# Patient Record
Sex: FEMALE | Race: BLACK OR AFRICAN AMERICAN | NOT HISPANIC OR LATINO | Employment: PART TIME | URBAN - METROPOLITAN AREA
[De-identification: names, ages, dates, MRNs, and addresses within clinical notes are randomized per-mention and may not be internally consistent; named-entity substitution may affect disease eponyms.]

---

## 2017-01-14 ENCOUNTER — HOSPITAL ENCOUNTER (EMERGENCY)
Facility: HOSPITAL | Age: 42
Discharge: HOME | End: 2017-01-14
Attending: EMERGENCY MEDICINE
Payer: MEDICAID

## 2017-01-14 VITALS
TEMPERATURE: 99 F | RESPIRATION RATE: 18 BRPM | HEIGHT: 67 IN | HEART RATE: 72 BPM | OXYGEN SATURATION: 98 % | SYSTOLIC BLOOD PRESSURE: 129 MMHG | WEIGHT: 293 LBS | BODY MASS INDEX: 45.99 KG/M2 | DIASTOLIC BLOOD PRESSURE: 72 MMHG

## 2017-01-14 DIAGNOSIS — M79.602 ARM PAIN, ANTERIOR, LEFT: Primary | ICD-10-CM

## 2017-01-14 PROCEDURE — 63600175 PHARM REV CODE 636 W HCPCS: Performed by: EMERGENCY MEDICINE

## 2017-01-14 PROCEDURE — 99283 EMERGENCY DEPT VISIT LOW MDM: CPT | Mod: 25

## 2017-01-14 PROCEDURE — 96372 THER/PROPH/DIAG INJ SC/IM: CPT

## 2017-01-14 RX ORDER — ORPHENADRINE CITRATE 30 MG/ML
60 INJECTION INTRAMUSCULAR; INTRAVENOUS
Status: COMPLETED | OUTPATIENT
Start: 2017-01-14 | End: 2017-01-14

## 2017-01-14 RX ORDER — KETOROLAC TROMETHAMINE 30 MG/ML
15 INJECTION, SOLUTION INTRAMUSCULAR; INTRAVENOUS
Status: COMPLETED | OUTPATIENT
Start: 2017-01-14 | End: 2017-01-14

## 2017-01-14 RX ORDER — DICLOFENAC SODIUM 75 MG/1
75 TABLET, DELAYED RELEASE ORAL 2 TIMES DAILY
Qty: 60 TABLET | Refills: 0 | Status: SHIPPED | OUTPATIENT
Start: 2017-01-14 | End: 2018-09-24

## 2017-01-14 RX ORDER — CYCLOBENZAPRINE HCL 10 MG
10 TABLET ORAL 3 TIMES DAILY PRN
Qty: 15 TABLET | Refills: 0 | Status: SHIPPED | OUTPATIENT
Start: 2017-01-14 | End: 2017-01-19

## 2017-01-14 RX ADMIN — KETOROLAC TROMETHAMINE 15 MG: 30 INJECTION, SOLUTION INTRAMUSCULAR at 03:01

## 2017-01-14 RX ADMIN — ORPHENADRINE CITRATE 60 MG: 30 INJECTION INTRAMUSCULAR; INTRAVENOUS at 03:01

## 2017-01-14 NOTE — ED TRIAGE NOTES
Pt arrives to ED via personal transportation with c/o left arm pain x couple days. Denies chest pain, SOB, n/v/d or any other symptoms at this time.

## 2017-01-14 NOTE — ED PROVIDER NOTES
Encounter Date: 1/14/2017       History     Chief Complaint   Patient presents with    arm pain     L arm pain and burning X 3 days. Denies injury.      Review of patient's allergies indicates:  No Known Allergies  HPI   Atraumatic L arm pain x 3 days, describes pain as L lateral humeral area down to L forearm  No neck pain, new activities, inability to ambulate, n/t/w hand  Never had this before  No self treatment    No past medical history on file.  No past medical history pertinent negatives.  No past surgical history on file.  No family history on file.  Social History   Substance Use Topics    Smoking status: Not on file    Smokeless tobacco: Not on file    Alcohol use Not on file     Review of Systems  All systems were reviewed and were negative except as noted in the HPI.    Physical Exam   Initial Vitals   BP Pulse Resp Temp SpO2   01/14/17 1512 01/14/17 1512 01/14/17 1512 01/14/17 1512 01/14/17 1512   129/72 72 18 99 °F (37.2 °C) 98 %     Physical Exam    Gen: awake, alert, NAD  Head NCAT, sclera clear  Neck supple, no meningismus  nt in midline  No carotid bruits  L arm, compartments are soft and externally normal without signs of trauma nor infection  Hands nvi  Extremities W/WP   Ambulation ok  Skin w/d  Psych conversant  Neuro: A/A, MONTALVO      ED Course   Procedures  Labs Reviewed   POCT URINE PREGNANCY        hcg neg  IM analgesics  Evaluation for Emergency Medical Condition  The patient received a medical screening exam and within a reasonable degree of clinical confidence an emergency medical condition has not been identified.  The patient is instructed on proper follow up and return precautions to the ED.                         ED Course     Clinical Impression:   The encounter diagnosis was Arm pain, anterior, left.      Discharged to home in stable condition, return to ED warnings given, follow up and patient care instructions given.    Jim Gil MD, MOISÉS, CPE, FACEP  Department of Emergency  Medicine  , [facility]       David Gil MD  01/14/17 9674

## 2017-01-14 NOTE — ED AVS SNAPSHOT
[facility]  [address] [place] [address]               Hadley Cano   2017  3:14 PM   ED    Description:  Female : 1975   Department:  [facility]           Your Care was Coordinated By:     Provider Role From To    David Gil MD Attending Provider 17 1514 --      Reason for Visit     arm pain           Diagnoses this Visit        Comments    Arm pain, anterior, left    -  Primary       ED Disposition     None           To Do List           Follow-up Information     Schedule an appointment as soon as possible for a visit with [facility].    Specialty:  Internal Medicine    Contact information:    [address] [place] [address] [phone]    Additional information:    3rd Floor, [address]       These Medications        Disp Refills Start End    diclofenac (VOLTAREN) 75 MG EC tablet 60 tablet 0 2017     Take 1 tablet (75 mg total) by mouth 2 (two) times daily. - Oral    cyclobenzaprine (FLEXERIL) 10 MG tablet 15 tablet 0 2017    Take 1 tablet (10 mg total) by mouth 3 (three) times daily as needed for Muscle spasms. - Oral      [facility] On [facility]     [facility] -  Assistance  Registered nurses in the [facility] provide clinical advisement, health education, appointment booking, and other advisory services.  Call for this free service at [phone].             Medications           Message regarding Medications     Verify the changes and/or additions to your medication regime listed below are the same as discussed with your clinician today.  If any of these changes or additions are incorrect, please notify your healthcare provider.        START taking these NEW medications        Refills    diclofenac (VOLTAREN) 75 MG EC tablet 0    Sig: Take 1 tablet (75 mg total) by mouth 2 (two) times daily.    Class: Print    Route:  "Oral    cyclobenzaprine (FLEXERIL) 10 MG tablet 0    Sig: Take 1 tablet (10 mg total) by mouth 3 (three) times daily as needed for Muscle spasms.    Class: Print    Route: Oral      These medications were administered today        Dose Freq    ketorolac injection 15 mg 15 mg ED 1 Time    Sig: Inject 15 mg into the muscle ED 1 Time.    Class: Normal    Route: Intramuscular    orphenadrine injection 60 mg 60 mg ED 1 Time    Sig: Inject 2 mLs (60 mg total) into the muscle ED 1 Time.    Class: Normal    Route: Intramuscular           Verify that the below list of medications is an accurate representation of the medications you are currently taking.  If none reported, the list may be blank. If incorrect, please contact your healthcare provider. Carry this list with you in case of emergency.           Current Medications     cyclobenzaprine (FLEXERIL) 10 MG tablet Take 1 tablet (10 mg total) by mouth 3 (three) times daily as needed for Muscle spasms.    diclofenac (VOLTAREN) 75 MG EC tablet Take 1 tablet (75 mg total) by mouth 2 (two) times daily.    ketorolac injection 15 mg Inject 15 mg into the muscle ED 1 Time.    orphenadrine injection 60 mg Inject 2 mLs (60 mg total) into the muscle ED 1 Time.           Clinical Reference Information           Your Vitals Were     BP Pulse Temp Resp Height Weight    129/72 (BP Location: Right arm) 72 99 °F (37.2 °C) (Oral) 18 5' 7" (1.702 m) 184.6 kg (407 lb)    SpO2 BMI             98% 63.75 kg/m2         Allergies as of 1/14/2017     No Known Allergies      Immunizations Administered on Date of Encounter - 1/14/2017     None      ED Micro, Lab, POCT     Start Ordered       Status Ordering Provider    01/14/17 1518 01/14/17 1517  POCT urine pregnancy  Once      Acknowledged       ED Imaging Orders     None      Discharge References/Attachments     PAIN, ACUTE, UNCERTAIN CAUSE (ENGLISH)      [facility] Sign-Up     Activating your [facility] account is as easy as 1-2-3!     1) Visit " [email or web address], select Sign Up Now, enter this activation code and your date of birth, then select Next.  [email or web address]  Expires: 2/28/2017  3:20 PM      2) Create a username and password to use when you visit [facility] in the future and select a security question in case you lose your password and select Next.    3) Enter your e-mail address and click Sign Up!    Additional Information  If you have questions, please e-mail [email or web address] or call [phone] to talk to our [facility] staff. Remember, [facility] is NOT to be used for urgent needs. For medical emergencies, dial 911.         Smoking Cessation     If you would like to quit smoking:   You may be eligible for free services if you are a [place] resident and started smoking cigarettes before September 1, 1988.  Call the Smoking Cessation Trust (SCT) toll free at (993) 990-5535 or (987) 791-6416.   Call 5-239-QUIT-NOW if you do not meet the above criteria.             [facility] complies with applicable Federal civil rights laws and does not discriminate on the basis of race, color, national origin, age, disability, or sex.        Language Assistance Services     ATTENTION: Language assistance services are available, free of charge. Please call [phone].      ATENCIÓN: Si habla español, tiene a grove disposición servicios gratuitos de asistencia lingüística. Llame al [phone].     CHÚ Ý: N?u b?n nói Ti?ng Vi?t, có các d?ch v? h? tr? ngôn ng? mi?n phí dành cho b?n. G?i s? [phone].

## 2018-01-03 ENCOUNTER — HOSPITAL ENCOUNTER (EMERGENCY)
Facility: HOSPITAL | Age: 43
Discharge: HOME | End: 2018-01-03
Attending: EMERGENCY MEDICINE
Payer: MEDICAID

## 2018-01-03 VITALS
SYSTOLIC BLOOD PRESSURE: 152 MMHG | HEIGHT: 67 IN | BODY MASS INDEX: 45.99 KG/M2 | HEART RATE: 66 BPM | OXYGEN SATURATION: 99 % | RESPIRATION RATE: 16 BRPM | WEIGHT: 293 LBS | TEMPERATURE: 98 F | DIASTOLIC BLOOD PRESSURE: 76 MMHG

## 2018-01-03 DIAGNOSIS — R10.13 EPIGASTRIC PAIN: ICD-10-CM

## 2018-01-03 DIAGNOSIS — R10.9 ABDOMINAL PAIN: ICD-10-CM

## 2018-01-03 DIAGNOSIS — K80.20 CALCULUS OF GALLBLADDER WITHOUT CHOLECYSTITIS WITHOUT OBSTRUCTION: Primary | ICD-10-CM

## 2018-01-03 LAB
ALBUMIN SERPL BCP-MCNC: 3.1 G/DL
ALP SERPL-CCNC: 89 U/L
ALT SERPL W/O P-5'-P-CCNC: 17 U/L
ANION GAP SERPL CALC-SCNC: 9 MMOL/L
AST SERPL-CCNC: 49 U/L
BACTERIA #/AREA URNS HPF: NORMAL /HPF
BASOPHILS # BLD AUTO: 0.01 K/UL
BASOPHILS NFR BLD: 0.1 %
BILIRUB SERPL-MCNC: 0.3 MG/DL
BILIRUB UR QL STRIP: NEGATIVE
BUN SERPL-MCNC: 12 MG/DL
CALCIUM SERPL-MCNC: 9.3 MG/DL
CHLORIDE SERPL-SCNC: 108 MMOL/L
CLARITY UR: ABNORMAL
CO2 SERPL-SCNC: 22 MMOL/L
COLOR UR: YELLOW
CREAT SERPL-MCNC: 0.8 MG/DL
DIFFERENTIAL METHOD: NORMAL
EOSINOPHIL # BLD AUTO: 0.1 K/UL
EOSINOPHIL NFR BLD: 0.7 %
ERYTHROCYTE [DISTWIDTH] IN BLOOD BY AUTOMATED COUNT: 13.8 %
EST. GFR  (AFRICAN AMERICAN): >60 ML/MIN/1.73 M^2
EST. GFR  (NON AFRICAN AMERICAN): >60 ML/MIN/1.73 M^2
GLUCOSE SERPL-MCNC: 116 MG/DL
GLUCOSE UR QL STRIP: NEGATIVE
HCT VFR BLD AUTO: 40.6 %
HGB BLD-MCNC: 13.2 G/DL
HGB UR QL STRIP: ABNORMAL
KETONES UR QL STRIP: NEGATIVE
LEUKOCYTE ESTERASE UR QL STRIP: ABNORMAL
LIPASE SERPL-CCNC: 18 U/L
LYMPHOCYTES # BLD AUTO: 3 K/UL
LYMPHOCYTES NFR BLD: 26.8 %
MCH RBC QN AUTO: 28.1 PG
MCHC RBC AUTO-ENTMCNC: 32.5 G/DL
MCV RBC AUTO: 86 FL
MICROSCOPIC COMMENT: NORMAL
MONOCYTES # BLD AUTO: 0.5 K/UL
MONOCYTES NFR BLD: 4.2 %
NEUTROPHILS # BLD AUTO: 7.6 K/UL
NEUTROPHILS NFR BLD: 68.2 %
NITRITE UR QL STRIP: NEGATIVE
PH UR STRIP: 5 [PH] (ref 5–8)
PLATELET # BLD AUTO: 329 K/UL
PMV BLD AUTO: 9.5 FL
POTASSIUM SERPL-SCNC: 4.2 MMOL/L
PROT SERPL-MCNC: 7.5 G/DL
PROT UR QL STRIP: NEGATIVE
RBC # BLD AUTO: 4.7 M/UL
RBC #/AREA URNS HPF: 2 /HPF (ref 0–4)
SODIUM SERPL-SCNC: 139 MMOL/L
SP GR UR STRIP: 1.02 (ref 1–1.03)
SQUAMOUS #/AREA URNS HPF: 3 /HPF
URN SPEC COLLECT METH UR: ABNORMAL
UROBILINOGEN UR STRIP-ACNC: ABNORMAL EU/DL
WBC # BLD AUTO: 11.15 K/UL
WBC #/AREA URNS HPF: 1 /HPF (ref 0–5)

## 2018-01-03 PROCEDURE — 80053 COMPREHEN METABOLIC PANEL: CPT

## 2018-01-03 PROCEDURE — 93005 ELECTROCARDIOGRAM TRACING: CPT

## 2018-01-03 PROCEDURE — 81000 URINALYSIS NONAUTO W/SCOPE: CPT

## 2018-01-03 PROCEDURE — 85025 COMPLETE CBC W/AUTO DIFF WBC: CPT

## 2018-01-03 PROCEDURE — 93010 ELECTROCARDIOGRAM REPORT: CPT | Mod: ,,, | Performed by: INTERNAL MEDICINE

## 2018-01-03 PROCEDURE — 99284 EMERGENCY DEPT VISIT MOD MDM: CPT | Mod: 25

## 2018-01-03 PROCEDURE — 83690 ASSAY OF LIPASE: CPT

## 2018-01-03 PROCEDURE — 25000003 PHARM REV CODE 250: Performed by: NURSE PRACTITIONER

## 2018-01-03 RX ORDER — ONDANSETRON 4 MG/1
4 TABLET, ORALLY DISINTEGRATING ORAL EVERY 8 HOURS PRN
Qty: 15 TABLET | Refills: 0 | Status: SHIPPED | OUTPATIENT
Start: 2018-01-03 | End: 2018-09-24

## 2018-01-03 RX ORDER — HYDROCODONE BITARTRATE AND ACETAMINOPHEN 5; 325 MG/1; MG/1
2 TABLET ORAL
Status: COMPLETED | OUTPATIENT
Start: 2018-01-03 | End: 2018-01-03

## 2018-01-03 RX ORDER — FAMOTIDINE 20 MG/1
20 TABLET, FILM COATED ORAL ONCE
Status: COMPLETED | OUTPATIENT
Start: 2018-01-03 | End: 2018-01-03

## 2018-01-03 RX ORDER — ONDANSETRON 4 MG/1
4 TABLET, ORALLY DISINTEGRATING ORAL
Status: COMPLETED | OUTPATIENT
Start: 2018-01-03 | End: 2018-01-03

## 2018-01-03 RX ADMIN — ONDANSETRON 4 MG: 4 TABLET, ORALLY DISINTEGRATING ORAL at 02:01

## 2018-01-03 RX ADMIN — LIDOCAINE HYDROCHLORIDE: 20 SOLUTION ORAL; TOPICAL at 02:01

## 2018-01-03 RX ADMIN — HYDROCODONE BITARTRATE AND ACETAMINOPHEN 2 TABLET: 5; 325 TABLET ORAL at 05:01

## 2018-01-03 RX ADMIN — FAMOTIDINE 20 MG: 20 TABLET, FILM COATED ORAL at 02:01

## 2018-01-03 NOTE — ED PROVIDER NOTES
Encounter Date: 1/3/2018    SCRIBE #1 NOTE: I, Daisy Quezada , am scribing for, and in the presence of,  Odalys Contreras NP . I have scribed the following portions of the note - Other sections scribed: HPI/ROS .       History     Chief Complaint   Patient presents with    Abdominal Pain     epigastric pain.. started earlier today when sister in same day surgery.. feels intermittent pains.. no hx of abdmoninal problems     CC: Abdominal Pain     HPI: This 42 y.o. female presents to the ED c/o sudden-onset abdominal pain described as a sharp, stabbing, and cramping pain across her upper abdomen and RUQ that was initially accompanied by SOB, diaphoresis, lightheadedness, nausea, and feeling hot which began while seated upstairs visiting her sister in same day surgery just 30 minutes PTA. She notes that the SOB, diaphoresis, lightheadedness, nausea, and sensation of feeling hot is her normal response to pain and adds that all of her symptoms have resolved except for her abdominal pain which is currently worst to the epigastric area. She ate a peppermint, but denies any other attempted pain tx. Pt last ate eggs and toast for breakfast several hours prior to the onset of pain. No BM today. She reports a hx of similar abdominal pain 1 year ago when she was evaluated at [facility] for possible gallstones, which she did not have at the time. She denies vomiting, diarrhea, numbness, weakness, chest pain, and any other associated symptoms.       The history is provided by the patient. No  was used.     Review of patient's allergies indicates:  No Known Allergies  No past medical history on file.  History reviewed. No pertinent surgical history.  History reviewed. No pertinent family history.  Social History   Substance Use Topics    Smoking status: Former Smoker     Types: Cigarettes    Smokeless tobacco: Not on file    Alcohol use No     Review of Systems   Constitutional: Negative for chills and fever.   HENT:  Negative for congestion, ear pain, rhinorrhea and sore throat.    Eyes: Negative for visual disturbance.   Respiratory: Negative for cough and shortness of breath.    Cardiovascular: Negative for chest pain.   Gastrointestinal: Positive for abdominal pain (across the upper abdomen and RUQ). Negative for constipation, diarrhea, nausea and vomiting.   Genitourinary: Negative for dysuria.   Musculoskeletal: Negative for back pain and neck pain.   Skin: Negative for rash.   Neurological: Negative for dizziness, weakness, light-headedness, numbness and headaches.       Physical Exam     Initial Vitals [01/03/18 1343]   BP Pulse Resp Temp SpO2   134/84 68 16 98.2 °F (36.8 °C) 99 %      MAP       100.67         Physical Exam    Nursing note and vitals reviewed.  Constitutional: Vital signs are normal. She appears well-developed and well-nourished. She is not diaphoretic. She is Obese . She is active and cooperative.  Non-toxic appearance. She does not appear ill. No distress.   Abdominal: Soft. Normal appearance and bowel sounds are normal. She exhibits no distension and no mass. There is tenderness in the right upper quadrant and epigastric area. There is no rigidity, no rebound, no guarding, no CVA tenderness, no tenderness at McBurney's point and negative Segovia's sign. No hernia.   Neurological: She is alert and oriented to person, place, and time.   Skin: Skin is dry. Capillary refill takes less than 2 seconds.         ED Course   Procedures  Labs Reviewed   COMPREHENSIVE METABOLIC PANEL - Abnormal; Notable for the following:        Result Value    CO2 22 (*)     Glucose 116 (*)     Albumin 3.1 (*)     AST 49 (*)     All other components within normal limits   URINALYSIS - Abnormal; Notable for the following:     Appearance, UA Hazy (*)     Occult Blood UA 2+ (*)     Urobilinogen, UA 2.0-3.0 (*)     Leukocytes, UA Trace (*)     All other components within normal limits   CBC W/ AUTO DIFFERENTIAL   LIPASE   URINALYSIS  MICROSCOPIC   POCT URINE PREGNANCY                Additional MDM:   Comments: This is an urgent evaluation of a 42-year-old female that presents to the emergency room complaining of abdominal pain with nausea today.  Patient reports she has had intermittent symptoms for the past month, but they are severe today.  She also reports that she was seen at [facility] emergency room approximately 1 year ago for similar complaint, but ultrasound was negative for gallstones at that time.  On exam, she appears uncomfortable but is not ill or toxic appearing.  She is afebrile with normal vital signs.  She has mild to moderate tenderness to palpation over the epigastrium and right upper quadrant.  Segovia sign negative.  Differential diagnoses included: Cholelithiasis, acute cholecystitis, gastritis versus peptic ulcer disease, hepatitis, pancreatitis, ileus.  I carefully considered but doubt appendicitis, ovarian torsion, diverticulitis, perforated bowel, incarcerated bowel.  Labs were grossly unremarkable, with no leukocytosis, transaminitis.  Her lipase and T bili were within normal limits.  The patient did receive a GI cocktail and Pepcid in the ED, with mild improvement of her pain.  She was then referred for a right upper quadrant ultrasound which revealed multiple stones within the gallbladder with questionable call bladder wall thickening measuring 3-4 mm.  There is no biliary duct dilatation or sonographic Segovia sign to suggest acute cholecystitis.  Based on her exam today, I highly doubt acute cholecystitis.  The patient still reports that she has right upper quadrant pain, but she continues to be afebrile with normal vital signs.  Symptoms are consistent with biliary colic.  Recommended follow-up with Gen. surgery as outpatient for further evaluation and management of her gallstones.  Rx Zofran and recommended low-fat diet.  She was given strict return precautions for any new or worsening symptoms, specifically  worsening abdominal pain, fevers, intractable vomiting.  She verbalized understanding and adherence..          Scribe Attestation:   Scribe #1: I performed the above scribed service and the documentation accurately describes the services I performed. I attest to the accuracy of the note.    Attending Attestation:           Physician Attestation for Scribe:  Physician Attestation Statement for Scribe #1: I, Odalys Contreras NP , reviewed documentation, as scribed by Daisy Quezada  in my presence, and it is both accurate and complete.                 ED Course      Clinical Impression:   The primary encounter diagnosis was Calculus of gallbladder without cholecystitis without obstruction. Diagnoses of Abdominal pain and Epigastric pain were also pertinent to this visit.    Disposition:   Disposition: Discharged  Condition: Stable                        Odalys Contreras NP  01/03/18 7708

## 2018-01-03 NOTE — ED TRIAGE NOTES
Pt reports constant pain in her middle upper abdomen that is sharp and shoots to her right upper abdomen x20 minutes. Pt also reports nausea.

## 2018-09-24 ENCOUNTER — OFFICE VISIT (OUTPATIENT)
Dept: ORTHOPEDICS | Facility: CLINIC | Age: 43
End: 2018-09-24
Payer: MEDICAID

## 2018-09-24 VITALS
SYSTOLIC BLOOD PRESSURE: 142 MMHG | DIASTOLIC BLOOD PRESSURE: 86 MMHG | HEIGHT: 67 IN | WEIGHT: 293 LBS | HEART RATE: 84 BPM | BODY MASS INDEX: 45.99 KG/M2

## 2018-09-24 DIAGNOSIS — M25.561 PAIN IN BOTH KNEES, UNSPECIFIED CHRONICITY: Primary | ICD-10-CM

## 2018-09-24 DIAGNOSIS — D16.21 OSTEOCHONDROMA OF TIBIA, RIGHT: ICD-10-CM

## 2018-09-24 DIAGNOSIS — M25.562 PAIN IN BOTH KNEES, UNSPECIFIED CHRONICITY: Primary | ICD-10-CM

## 2018-09-24 DIAGNOSIS — M17.0 OSTEOARTHRITIS OF BOTH KNEES, UNSPECIFIED OSTEOARTHRITIS TYPE: ICD-10-CM

## 2018-09-24 PROCEDURE — 99204 OFFICE O/P NEW MOD 45 MIN: CPT | Mod: S$PBB,,, | Performed by: ORTHOPAEDIC SURGERY

## 2018-09-24 PROCEDURE — 99213 OFFICE O/P EST LOW 20 MIN: CPT | Mod: PBBFAC,PN | Performed by: ORTHOPAEDIC SURGERY

## 2018-09-24 PROCEDURE — 99999 PR PBB SHADOW E&M-EST. PATIENT-LVL III: CPT | Mod: PBBFAC,,, | Performed by: ORTHOPAEDIC SURGERY

## 2018-09-24 RX ORDER — MELOXICAM 15 MG/1
TABLET ORAL
COMMUNITY
Start: 2018-08-31 | End: 2018-09-24

## 2018-09-24 RX ORDER — NAPROXEN 500 MG/1
500 TABLET ORAL 2 TIMES DAILY
Qty: 60 TABLET | Refills: 0 | Status: SHIPPED | OUTPATIENT
Start: 2018-09-24 | End: 2018-10-02

## 2018-09-24 NOTE — PROGRESS NOTES
"CC: bilateral knee pain    43 y.o. Female presents today for evaluation of her bilateral knee pain. She admits to pain starting a few months ago and bothering her consistently since. She has tried a "pain ointment" which has helped but only minimally. She has a 2 year old child who she admits to playing with and kneeling and moving around frequently makes her pain worse. She admits to radiation of her pain down her right leg with occasional tingling to her foot. Her left knee especially medially is her most painful area. She has taken Mobic for this but she states that this causes her to get a headache even if she cuts it in half or if she takes it at night. She denies any instability, denies recent or remote injuries or trauma, and denies any locking. She does feel this is affecting her ADLs and recently had a friend who underwent a knee replacement so she wanted to get this checked out. She has not gone to PT for this issue and has not had a joint injection done yet.     REVIEW OF SYSTEMS:   Constitution: Patient denies fever, chills, night sweats, and weight changes.  Eyes: Patient denies eye pain or vision changes.  HENT: Patient denies headache, ear pain, sore throat, or nasal discharge.  CVS: Patient denies chest pain.  Lungs: Patient denies shortness of breath or cough.  Abd: Patient denies stomach pain, nausea, or vomiting.  Skin: Patient denies skin rash or itching.    Hematologic/Lymphatic: Patient denies easy bruising.   Musculoskeletal: Patient denies recent falls. See HPI.  Psych: Patient denies any current anxiety or nervousness.    PAST MEDICAL HISTORY:   Past Medical History:   Diagnosis Date    Arthritis        PAST SURGICAL HISTORY:   History reviewed. No pertinent surgical history.    FAMILY HISTORY:   History reviewed. No pertinent family history.    SOCIAL HISTORY:   Social History     Socioeconomic History    Marital status: Single     Spouse name: Not on file    Number of children: Not on " "file    Years of education: Not on file    Highest education level: Not on file   Social Needs    Financial resource strain: Not on file    Food insecurity - worry: Not on file    Food insecurity - inability: Not on file    Transportation needs - medical: Not on file    Transportation needs - non-medical: Not on file   Occupational History    Not on file   Tobacco Use    Smoking status: Former Smoker     Types: Cigarettes   Substance and Sexual Activity    Alcohol use: No    Drug use: No    Sexual activity: Not on file   Other Topics Concern    Not on file   Social History Narrative    Not on file       MEDICATIONS:     Current Outpatient Medications:     naproxen (EC NAPROSYN) 500 MG EC tablet, Take 1 tablet (500 mg total) by mouth 2 (two) times daily., Disp: 60 tablet, Rfl: 0    ALLERGIES:   Review of patient's allergies indicates:  No Known Allergies     PHYSICAL EXAMINATION:  BP (!) 142/86 (BP Location: Right arm, Patient Position: Sitting, BP Method: Large (Manual))   Pulse 84   Ht 5' 7" (1.702 m)   Wt (!) 199.8 kg (440 lb 6.4 oz)   LMP 09/03/2018   BMI 68.98 kg/m²   Vitals signs and nursing note have been reviewed.  General: In no acute distress, well developed, well nourished, no diaphoresis  Eyes: EOM full and smooth, no eye redness or discharge  HENT: normocephalic and atraumatic, neck supple, trachea midline, no nasal discharge, no external ear redness or discharge  Cardiovascular: 2+ and symmetric DP pulses bilaterally  Lungs: respirations non-labored, no conversational dyspnea   Abd: non-distended, no rigidity  MSK: no amputation or deformity, no swelling of extremities  Neuro: AAOx3, CN2-12 grossly intact  Skin: No rashes, warm and dry  Psychiatric: cooperative, pleasant, mood and affect appropriate for age    MUSCULOSKELETAL EXAM:    BILATERAL KNEE EXAMINATION     Observation:  No edema, erythema, ecchymosis, or effusion noted.  No muscle atrophy of the thighs and calves " noted.  Knee anatomy difficult to identify due to body habitus.      Tenderness:  Patella - present around each patella Lateral joint line - present on both side  Quad tendon - none   Medial joint line - present on left more than right  Patellar tendon - none   Medial plica - none  Tibial tubercle - none   Lateral plica - none  Pes anserine - none   MCL prox - none  Distal ITB - none   MCL distal - none  MFC - none    LCL prox - none  LFC - none    LCL distal - none  Tibia - none    Fibula - none    No obvious bursae, plicae, popliteal cysts, or tendon derangement palpated.          ROM:   Active extension to 0° on left without hyperextension with crepitus  Active extension to 0° on right without hyperextension with crepitus  Active flexion to 135° on left and 135° on right.    Strength: (bilaterally)  Knee Flexion - 5/5 on left and 5/5 on right  Knee Extension - 5/5 on left and 5/5 on right    Patellofemoral Exam:  No patellar laxity with medial and lateral translation   No apprehension with medial and lateral patellar translation.     Meniscus Testing:     No pain with terminal extension and flexion.    Ligament Testing:  No laxity with anterior drawer.  No laxity with posterior drawer.    No laxity with varus testing at 0 and 30 degrees.  No laxity with valgus testing at 0 and 30 degrees.    Neurovascular Examination:   Normal gait without antalgia.  Sensation intact to light touch in the obturator, lateral/intermediate/medial/posterior femoral cutaneous, saphenous, and common peroneal nerves bilaterally.  Pulses intact at the DP and PT arteries bilaterally.    Capillary refill intact <2 seconds in all toes bilaterally.      IMAGIN. X-ray ordered due to bilateral knee pain  2. X-ray images were reviewed personally by me and then directly with patient.  3. FINDINGS: X-ray images obtained demonstrate no cortical irregularities, sclerosis, osteophyte formation, or subchonral cysts. There is mild joint space  narrowing in all 3 compartments of each knee. Bone growth on right tibia consistent with osteochondroma.  4. IMPRESSION: Joint space narrowing of all compartments of each knee, worst in the left medial. Osteochondroma present on right tibia.      ASSESSMENT:      ICD-10-CM ICD-9-CM   1. Pain in both knees, unspecified chronicity M25.561 719.46    M25.562    2. Osteoarthritis of both knees, unspecified osteoarthritis type M17.0 715.96   3. Class 3 obesity with body mass index (BMI) of 60.0 to 69.9 in adult, unspecified obesity type, unspecified whether serious comorbidity present E66.9 278.00    Z68.44 V85.44       PLAN:  1-2. Knee pain/OA  - XRs obtained in the office today and images were personally reviewed with the patient.   - Overall OA seems mild and could benefit well from conservative options as she has not tried any yet.  - Rx for PT sent today for her to start a PT regimen to get an HEP.  - Rx for Naprosyn 500mg tabs BID.    3. Obesity -   - Discussed importance of weight loss to help with the worsening of her knee OA as even modest weight loss can make an impact on knee pain. Advised starting with walking around her block daily as she has not been doing this recently. In addition, exercises from PT could be done     4. Osteochondroma -   - Seen on XR of right tibia. Benign finding.    Future planning includes - if no improvement with PT and PO meds, possibly US guided steroid injection    All questions were answered to the best of my ability and all concerns were addressed at this time.    Follow up in 4-6 weeks for above, or sooner if needed.

## 2018-09-24 NOTE — LETTER
September 25, 2018      Uma Colvin, NP  [address]  [facility] [address]           [facility]  [address]  [place] [address]  Phone: [phone]  Fax: [phone]          Patient: Hadley Cano   MR Number: [id number]   YOB: 1975   Date of Visit: 9/24/2018       Dear Uma Colvin:    Thank you for referring Hadley Cano to me for evaluation. Attached you will find relevant portions of my assessment and plan of care.    If you have questions, please do not hesitate to call me. I look forward to following Hadley Cano along with you.    Sincerely,    Nicolás Brooks, DO    Enclosure  CC:  No Recipients    If you would like to receive this communication electronically, please contact [email or web address] or [phone] to request more information on [facility] access.    For providers and/or their staff who would like to refer a patient to [facility], please contact us through our one-stop-shop provider referral line, [facility] , at [phone].    If you feel you have received this communication in error or would no longer like to receive these types of communications, please e-mail [email or web address]

## 2018-10-02 DIAGNOSIS — M25.562 PAIN IN BOTH KNEES, UNSPECIFIED CHRONICITY: ICD-10-CM

## 2018-10-02 DIAGNOSIS — M25.561 PAIN IN BOTH KNEES, UNSPECIFIED CHRONICITY: ICD-10-CM

## 2018-10-04 DIAGNOSIS — M25.562 PAIN IN BOTH KNEES, UNSPECIFIED CHRONICITY: Primary | ICD-10-CM

## 2018-10-04 DIAGNOSIS — M25.561 PAIN IN BOTH KNEES, UNSPECIFIED CHRONICITY: Primary | ICD-10-CM

## 2018-10-04 RX ORDER — MELOXICAM 15 MG/1
15 TABLET ORAL DAILY
Qty: 30 TABLET | Refills: 1 | Status: SHIPPED | OUTPATIENT
Start: 2018-10-04 | End: 2018-10-04

## 2018-10-04 RX ORDER — NAPROXEN SODIUM 550 MG/1
550 TABLET ORAL 2 TIMES DAILY WITH MEALS
Qty: 60 TABLET | Refills: 1
Start: 2018-10-04 | End: 2019-07-17

## 2018-10-16 ENCOUNTER — CLINICAL SUPPORT (OUTPATIENT)
Dept: REHABILITATION | Facility: HOSPITAL | Age: 43
End: 2018-10-16
Attending: ORTHOPAEDIC SURGERY
Payer: MEDICAID

## 2018-10-16 DIAGNOSIS — M25.562 CHRONIC PAIN OF BOTH KNEES: ICD-10-CM

## 2018-10-16 DIAGNOSIS — M25.561 CHRONIC PAIN OF BOTH KNEES: ICD-10-CM

## 2018-10-16 DIAGNOSIS — M25.662 DECREASED RANGE OF MOTION (ROM) OF BOTH KNEES: ICD-10-CM

## 2018-10-16 DIAGNOSIS — M25.661 DECREASED RANGE OF MOTION (ROM) OF BOTH KNEES: ICD-10-CM

## 2018-10-16 DIAGNOSIS — G89.29 CHRONIC PAIN OF BOTH KNEES: ICD-10-CM

## 2018-10-16 DIAGNOSIS — R26.2 DIFFICULTY WALKING: ICD-10-CM

## 2018-10-16 DIAGNOSIS — R29.898 DECREASED STRENGTH OF LOWER EXTREMITY: ICD-10-CM

## 2018-10-16 PROCEDURE — 97161 PT EVAL LOW COMPLEX 20 MIN: CPT | Mod: PN

## 2018-10-16 NOTE — PROGRESS NOTES
Please See Full Physical Therapy Evaluation in Plan of Care                                                        Physical Therapy Initial Evaluation     Name: Hadley Cano  Clinic Number: [id number]    Hadley is a 43 y.o. female evaluated on 10/16/2018.     Diagnosis:   Encounter Diagnoses   Name Primary?    Chronic pain of both knees     Decreased range of motion (ROM) of both knees     Decreased strength of lower extremity     Difficulty walking      Physician: Nicolás Brooks, DO  Treatment Orders: PT Eval and Treat    TREATMENT     Time In: 900  Time Out: 1000    PT Evaluation Completed? Yes  Discussed Plan of Care with patient: Yes    Hadley received 15 minutes of therapeutic exercise including:     Quad Set 10x ea  SAQ 10x ea     Hadley received 0 minutes of manual therapy including:    Written Home Exercises Provided: Yes - QS, SAQ  Hadley demonstrated good understanding of the education provided. Patient demonstrated good return demonstration of skill of exercises.    ASSESSMENT     Pt's spiritual, cultural and educational needs considered and pt agreeable to plan of care and goals as stated below:     Short Term GOALS: 4 weeks. Pt agrees with goals set.  1. Patient demonstrates independence with HEP.   2. Patient demonstrates independence with Postural Awareness.   3. Patient demonstrates independence with body mechanics.   4. Patient will report pain of 5/10 at worst, on 0-10 pain scale, with all activity  5 Patient demonstrates increased B knee flexion ROM by 5 degrees or greater to improve tolerance to functional activities pain free.   6. Patient demonstrates increased strength BLE's by 1/3 muscle grade or greater to improve tolerance to functional activities pain free.     Long Term GOALS: 8 weeks. Pt agrees with goals set.  1. Patient demonstrates increased B knee ROM to 0-115 degrees or greater to improve tolerance to functional activities pain free.   2. Patient demonstrates increased strength BLE's  to 4+/5 or greater to improve tolerance to functional activities pain free.   3. Patient demonstrates improved overall function per FOTO Knee Survey to 40% Limitation or less.   4. Patient will report pain of 3/10 at worst, on 0-10 pain scale, with all activity  5. Patient demonstrates ability to walk 2 blocks, appropriate gait pattern, no pain provocation  6. Patient demonstrates ability to perform functional squat, appropriate movement pattern, no pain provocation to increase tolerance to work activity    Functional Limitations Reports - G Codes  Category: mobility  Tool: FOTO Knee Survey  Score: 49% Limitation   Modifier  Impairment Limitation Restriction    CH  0 % impaired, limited or restricted    CI  @ least 1% but less than 20% impaired, limited or restricted    CJ  @ least 20%<40% impaired, limited or restricted    CK  @ least 40%<60% impaired, limited or restricted    CL  @ least 60% <80% impaired, limited or restricted    CM  @ least 80%<100% impaired limited or restricted    CN  100% impaired, limited or restricted     Current/  CK = 40-60% Limitation  Goal/ : CJ = 20-40% Limitation     PLAN     Certification Period: 10/16/18 - 1/16/19    Outpatient physical therapy 1-2 times weekly to include: pt ed, hep, therapeutic exercises, neuromuscular re-education/ balance exercises, manual therapy, joint mobilizations, aquatic therapy and modalities prn. Cont PT for  10-12 weeks. Pt may be seen by PTA as part of the rehabilitation team.     Therapist: Oscar Trevino, PT  10/16/2018      I have seen the patient, reviewed the therapist's plan of care, and I agree with the plan of care.      I certify the need for these services furnished under this plan of treatment and while under my care.     ___________________ ________ Physician/Referring Practitioner            ___________________________ Date of Signature

## 2018-10-16 NOTE — PLAN OF CARE
Physical Therapy Initial Evaluation     Name: Hadley Cano  Clinic Number: [id number]    Hadley is a 43 y.o. female evaluated on 10/16/2018.     Diagnosis:   Encounter Diagnoses   Name Primary?    Chronic pain of both knees     Decreased range of motion (ROM) of both knees     Decreased strength of lower extremity     Difficulty walking      Physician: Nicolás Brooks,   Treatment Orders: PT Eval and Treat    Past Medical History:   Diagnosis Date    Arthritis      Current Outpatient Medications   Medication Sig    naproxen sodium (ANAPROX) 550 MG tablet Take 1 tablet (550 mg total) by mouth 2 (two) times daily with meals.     No current facility-administered medications for this visit.      Review of patient's allergies indicates:   Allergen Reactions    Strawberry      Precautions: Fall    Subjective     Patient States: Chief complaint is B knee pain with L knee pain more painful than R knee currently. ~2 months ago she was playing with her young child and felt pain in R knee initially. Eventually L knee pain began more problematic. L knee pain begins at L patella and radiates superiorly to quad/thigh. R knee pain is isolated patellar region. States she can have tingling in L knee, radiating to L entire foot, if she sits for too long. Denies surgeries/injections to B LEs. Denies knees buckling/giving out. Reports B knee audible/palpable crepitus. 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 States she can 1-2 blocks before having to stop due to knee pain.     Diagnostic Tests: X-ray  Mild tricompartmental joint space loss is present bilaterally. Small 7 mm pedunculated osteochondroma is present at the medial right tibial metaphysis.    Pain Scale: Hadley rates pain on a scale of 0-10 to be 10 at worst; 7 currently; 5 at best  .  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 L anterior knee 4/5   Hamstrings 4-/5 4-/5     Strength: Hip   Left Right   Iliopsoas 4-/5 4-/5   Glute Med 3+/5 3+/5   Hip IR 4-/5 4-/5   Hip ER 4-/5 4-/5   Hip Ext 3/5 3+/5   Ankle PF 4-/5 4/5   Ankle DF 4+/5 4+/5     Joint Mobility: hypomobile B patellofemoral  Palpation: Tenderness at L medial joint line/adductor musculature  Sensation: intact to light touch    Edema:  Left: absent  Right: absent    Gait: Cano ambulated with none.  Level of Assistance: independent  Patient displays antalgic gait with decreased stance on LLE, increased trunk lean/pelvic drop towards L,     TREATMENT     Time In: 900  Time Out: 1000    PT Evaluation Completed? Yes  Discussed Plan of Care with patient: Yes    Hadley received 15 minutes of therapeutic exercise including:     Quad Set 10x ea  SAQ 10x ea     Hadley received 0 minutes of manual therapy including:    Written Home Exercises Provided: Yes - QS, SAQ  Hadley demonstrated good understanding of the education provided. Patient demonstrated good return demonstration of skill of exercises.    ASSESSMENT     Patient presents to Physical Therapy Evaluation with diagnosis of B Knee Pain, with signs and symptoms including: increased B knee pain, decreased knee ROM, decreased LE strength, difficulty walking,  joint hypomobility, soft tissue dysfunction, and decreased tolerance to functional activity. Pt with pain greater at L knee compared to R knee, with pain isolated primarily to medial joint line and medial adductor/distal quad musculature. Decreased strength throughout B Hip, knee, and ankle musculature; with greatest deficit in gluteal musculature in abduction/extension planes. Decreased L knee ROM in flexion plane, with moderate pain at end-range. Significant antalgic gait pattern noted with decreased stance time on L LE, compensatory pelvic drop/trunk lean, and decreased stride length/gait speed. Signs/symptoms consistent with B knee pain (L > R) related to OA and soft tissue dysfunction. Pt with good motivation to perform physical activity and responds well to cueing.    Pt prognosis is Good.  Pt will benefit from skilled outpatient physical therapy to address the above stated deficits, provide pt/family education and to maximize pt's level of independence.     Medical necessity is demonstrated by the following IMPAIRMENTS/PROBLEMS:  weakness, impaired endurance, impaired self care skills, impaired functional mobility, gait instability, decreased coordination, decreased lower extremity function, decreased safety awareness, pain, abnormal tone, decreased ROM, impaired joint extensibility and impaired muscle length.    History  Co-morbidities and personal factors that may impact the plan of care Examination  Body Structures and Functions, activity limitations and participation restrictions that may impact the plan of care Clinical Presentation   Decision Making/ Complexity Score   Co-morbidities:         High BMI        Personal Factors:    Body Regions: BLE, trunk/core     Body Systems: musculoskeletal (ROM, strength, endurance, flexibility, gait); neuromuscular (balance, posture, motor control, coordination)        Activity limitations: walking, standing, squatting, lifting, stairs, bending, kicking, carrying,  pushing/pulling      Participation Restrictions: working activity, heavy household activity, recreational activity, community interaction, social participation           Stable, uncomplicated     Low Complexity    FOTO Knee Survey  Score: 49% Limitation     Pt's spiritual, cultural and educational needs considered and pt agreeable to plan of care and goals as stated below:     Short Term GOALS: 4 weeks. Pt agrees with goals set.  1. Patient demonstrates independence with HEP.   2. Patient demonstrates independence with Postural Awareness.   3. Patient demonstrates independence with body mechanics.   4. Patient will report pain of 5/10 at worst, on 0-10 pain scale, with all activity  5 Patient demonstrates increased B knee flexion ROM by 5 degrees or greater to improve tolerance to functional activities pain free.   6. Patient demonstrates increased strength BLE's by 1/3 muscle grade or greater to improve tolerance to functional activities pain free.     Long Term GOALS: 8 weeks. Pt agrees with goals set.  1. Patient demonstrates increased B knee ROM to 0-115 degrees or greater to improve tolerance to functional activities pain free.   2. Patient demonstrates increased strength BLE's to 4+/5 or greater to improve tolerance to functional activities pain free.   3. Patient demonstrates improved overall function per FOTO Knee Survey to 40% Limitation or less.   4. Patient will report pain of 3/10 at worst, on 0-10 pain scale, with all activity  5. Patient demonstrates ability to walk 2 blocks, appropriate gait pattern, no pain provocation  6. Patient demonstrates ability to perform functional squat, appropriate movement pattern, no pain provocation to increase tolerance to work activity    Functional Limitations Reports - G Codes  Category: mobility  Tool: FOTO Knee Survey  Score: 49% Limitation   Modifier  Impairment Limitation Restriction    CH  0 % impaired, limited or restricted    CI  @ least 1% but less than 20%  impaired, limited or restricted    CJ  @ least 20%<40% impaired, limited or restricted    CK  @ least 40%<60% impaired, limited or restricted    CL  @ least 60% <80% impaired, limited or restricted    CM  @ least 80%<100% impaired limited or restricted    CN  100% impaired, limited or restricted     Current/  CK = 40-60% Limitation  Goal/ : CJ = 20-40% Limitation     PLAN     Certification Period: 10/16/18 - 1/16/19    Outpatient physical therapy 1-2 times weekly to include: pt ed, hep, therapeutic exercises, neuromuscular re-education/ balance exercises, manual therapy, joint mobilizations, aquatic therapy and modalities prn. Cont PT for  10-12 weeks. Pt may be seen by PTA as part of the rehabilitation team.     Therapist: Oscar Trevino, PT  10/16/2018      I have seen the patient, reviewed the therapist's plan of care, and I agree with the plan of care.      I certify the need for these services furnished under this plan of treatment and while under my care.     ___________________ ________ Physician/Referring Practitioner            ___________________________ Date of Signature

## 2018-10-24 ENCOUNTER — DOCUMENTATION ONLY (OUTPATIENT)
Dept: REHABILITATION | Facility: HOSPITAL | Age: 43
End: 2018-10-24

## 2018-10-24 NOTE — PROGRESS NOTES
Physical Therapy: No show/Cancellation of Visit  Date: 10/24/2018    Patient cancelled today's PT appointment. Patient's next scheduled appointment is 11/7/2018.     Cancel: 1  No show: 0    Therapist: Rhianna Durham PTA

## 2018-11-07 ENCOUNTER — CLINICAL SUPPORT (OUTPATIENT)
Dept: REHABILITATION | Facility: HOSPITAL | Age: 43
End: 2018-11-07
Attending: ORTHOPAEDIC SURGERY
Payer: MEDICAID

## 2018-11-07 DIAGNOSIS — G89.29 CHRONIC PAIN OF BOTH KNEES: ICD-10-CM

## 2018-11-07 DIAGNOSIS — R26.2 DIFFICULTY WALKING: ICD-10-CM

## 2018-11-07 DIAGNOSIS — M25.561 CHRONIC PAIN OF BOTH KNEES: ICD-10-CM

## 2018-11-07 DIAGNOSIS — M25.562 CHRONIC PAIN OF BOTH KNEES: ICD-10-CM

## 2018-11-07 DIAGNOSIS — M25.661 DECREASED RANGE OF MOTION (ROM) OF BOTH KNEES: ICD-10-CM

## 2018-11-07 DIAGNOSIS — R29.898 DECREASED STRENGTH OF LOWER EXTREMITY: ICD-10-CM

## 2018-11-07 DIAGNOSIS — M25.662 DECREASED RANGE OF MOTION (ROM) OF BOTH KNEES: ICD-10-CM

## 2018-11-07 PROCEDURE — 97110 THERAPEUTIC EXERCISES: CPT | Mod: PN

## 2018-11-07 NOTE — PROGRESS NOTES
"  Physical Therapy Daily Treatment Note     Name: Hadley Cano  Clinic Number: [id number]    Therapy Diagnosis:   Encounter Diagnoses   Name Primary?    Chronic pain of both knees     Decreased range of motion (ROM) of both knees     Decreased strength of lower extremity     Difficulty walking      Physician: Nicolás Brooks DO    Visit Date: 11/7/2018    Physician Orders: PT Eval and Treat  Medical Diagnosis: Bilateral knee pain  Evaluation Date: 10/16/18  Authorization Period Expiration: 1/24/19  Plan of Care Certification Period: 10/16/18 to 1/16/19  Visit #/Visits authorized: 1 / 12 (new referral; total visits 2)     Time In: 1420 - pt arrived to session 20 minutes late  Time Out: 1500  Total Billable Time: 10 minutes    Precautions: Fall    Subjective     Pt reports: that she feels the same since evaluation, no change in symptoms.  She was compliant with home exercise program.  Response to previous treatment: good  Functional change: none    Pain: 8/10  Location: bilateral knee pain, Left > Right    Objective     Hadley received therapeutic exercises to develop strength, endurance, ROM, flexibility, posture and core stabilization for 40 minutes including:    Quad sets: 20 x 3" ea.   SAQ: 20 x 3" hold ea.  HL Hip Adduction ball squeeze: 20 x 5" ea.    HL Hip Abduction: Green TB x 20  Supine glute squeezes:  20 x 3" ea.    SLR: 2 x 10 ea.  SL Clamshells: Green TB x 20 ea.  SL Hip Abduction: 2 x 10 ea.     Supine heel slides with sliding board: 2 minutes ea.    Sit to stands from blue table: 20x    Consider adding next visit: standing heel raises, standing hip abduction, standing mini squats    Hadley received cold pack for 00 minutes to bilateral knees. - none today, pt declined    Home Exercises Provided and Patient Education Provided     Education provided:   - importance of continued compliance with HEP    Written Home Exercises Provided: Patient instructed to cont prior HEP.  Exercises were reviewed and Hadley " was able to demonstrate them prior to the end of the session.  Hadley demonstrated good  understanding of the education provided.     See EMR under Patient Instructions for exercises provided prior visit.    Assessment     Patient tolerated treatment session well today. Patient had L knee and hip pain during abduction and SLR exercises, however, patient able to finish the whole treatment. Patient required verbal cueing throughout session for proper technique with exercises, patient with good correction. Patient will benefit from progression of balance and LE strengthening exercises.     Hadley is progressing well towards her goals.   Pt prognosis is Good.     Pt will continue to benefit from skilled outpatient physical therapy to address the deficits listed in the problem list box on initial evaluation, provide pt/family education and to maximize pt's level of independence in the home and community environment.     Pt's spiritual, cultural and educational needs considered and pt agreeable to plan of care and goals.    Goals:   Short Term GOALS: 4 weeks. Pt agrees with goals set.  1. Patient demonstrates independence with HEP.   2. Patient demonstrates independence with Postural Awareness.   3. Patient demonstrates independence with body mechanics.   4. Patient will report pain of 5/10 at worst, on 0-10 pain scale, with all activity  5 Patient demonstrates increased B knee flexion ROM by 5 degrees or greater to improve tolerance to functional activities pain free.   6. Patient demonstrates increased strength BLE's by 1/3 muscle grade or greater to improve tolerance to functional activities pain free.     Plan     Outpatient physical therapy 1-2 times weekly to include: pt ed, hep, therapeutic exercises, neuromuscular re-education/ balance exercises, manual therapy, joint mobilizations, aquatic therapy and modalities prn. Cont PT for  10-12 weeks. Pt may be seen by PTA as part of the rehabilitation team.     Rhianna WONG  , PTA   11/07/2018

## 2018-12-11 ENCOUNTER — CLINICAL SUPPORT (OUTPATIENT)
Dept: REHABILITATION | Facility: HOSPITAL | Age: 43
End: 2018-12-11
Attending: ORTHOPAEDIC SURGERY
Payer: MEDICAID

## 2018-12-11 DIAGNOSIS — R29.898 DECREASED STRENGTH OF LOWER EXTREMITY: ICD-10-CM

## 2018-12-11 DIAGNOSIS — M25.562 CHRONIC PAIN OF BOTH KNEES: ICD-10-CM

## 2018-12-11 DIAGNOSIS — M25.662 DECREASED RANGE OF MOTION (ROM) OF BOTH KNEES: ICD-10-CM

## 2018-12-11 DIAGNOSIS — M25.561 CHRONIC PAIN OF BOTH KNEES: ICD-10-CM

## 2018-12-11 DIAGNOSIS — G89.29 CHRONIC PAIN OF BOTH KNEES: ICD-10-CM

## 2018-12-11 DIAGNOSIS — M25.661 DECREASED RANGE OF MOTION (ROM) OF BOTH KNEES: ICD-10-CM

## 2018-12-11 DIAGNOSIS — R26.2 DIFFICULTY WALKING: ICD-10-CM

## 2018-12-11 PROCEDURE — 97110 THERAPEUTIC EXERCISES: CPT | Mod: PN

## 2018-12-11 NOTE — PROGRESS NOTES
"  Physical Therapy Daily Treatment Note     Name: Hadley Cano  Clinic Number: [id number]    Therapy Diagnosis:   Encounter Diagnoses   Name Primary?    Chronic pain of both knees     Decreased range of motion (ROM) of both knees     Decreased strength of lower extremity     Difficulty walking      Physician: Nicolás Brooks DO    Visit Date: 12/11/2018    Physician Orders: PT Eval and Treat  Medical Diagnosis: Bilateral knee pain  Evaluation Date: 10/16/18  Authorization Period Expiration: 1/24/19  Plan of Care Certification Period: 10/16/18 to 1/16/19  Visit #/Visits authorized: 2 / 12 (new referral; total visits 3)     Time In: 1000  Time Out: 1100  Total Billable Time: 60 minutes    Precautions: Fall    Subjective     Pt reports: afternoon appointment times no longer work for her. States no difference in B knee pain recently. Reports fair compliance with HEP  She was compliant with home exercise program.  Response to previous treatment: good  Functional change: none    Pain: 7/10  Location: bilateral knee pain, Left > Right    Objective     Strength: Knee    Left Right   Quadriceps 4/5 p! L anterior knee 4/5   Hamstrings 4-/5 4-/5      Strength: Hip    Left Right   Iliopsoas 4-/5 4-/5   Glute Med 3+/5 3+/5   Hip IR 4-/5 4-/5   Hip ER 4-/5 4-/5   Hip Ext 3/5 3+/5   Ankle PF 4-/5 4/5   Ankle DF 4+/5 4+/5     Hadley received therapeutic exercises to develop strength, endurance, ROM, flexibility, posture and core stabilization for 40 minutes including:    +Floor Bike 8 min  +Seated March 3x10 ea  +LAQ 3x10  +Seated Heel Raises 3x10     Quad sets: 3x10 x 3" ea.   SAQ 3#: 30 x 3" hold ea. (to neutral)    HL Hip Adduction ball squeeze: 30 x 5" ea.    HL Hip Abduction: Green TB x 20  SL Clamshells: Green TB x 30 ea.  Glute Squeeze -> Mini Bridge:  20 x 3" ea.    SLR: 3 x 10 ea.  Sit to stands from blue table: 20x  +Standing Hip 3-way 2x10 ea    SL Hip Abduction: 2 x 10 ea.   Supine heel slides with sliding board: 2 " minutes ea.      Consider adding next visit: standing heel raises, standing hip abduction, standing mini squats    Hadley received cold pack for 00 minutes to bilateral knees. - none today, pt declined    Home Exercises Provided and Patient Education Provided     Education provided:   - importance of continued compliance with HEP    Written Home Exercises Provided: Patient instructed to cont prior HEP.  Exercises were reviewed and Hadley was able to demonstrate them prior to the end of the session.  Hadley demonstrated good  understanding of the education provided.     See EMR under Patient Instructions for exercises provided prior visit.    Assessment     Patient tolerated treatment session well today. Good response to aerobic/ROM warm-up with floor bike, muscle fatigue in B quads and general fatigue due to deconditioning. Able to progress to standing hip strengthening without adverse effect. Improved symptom relief reported at conclusion of therex program.    Hadley is progressing well towards her goals.   Pt prognosis is Good.     Pt will continue to benefit from skilled outpatient physical therapy to address the deficits listed in the problem list box on initial evaluation, provide pt/family education and to maximize pt's level of independence in the home and community environment.     Pt's spiritual, cultural and educational needs considered and pt agreeable to plan of care and goals.    Goals:   Short Term GOALS: 4 weeks. Pt agrees with goals set. Goals updated 12/11/18  1. Patient demonstrates independence with HEP. In progress  2. Patient demonstrates independence with Postural Awareness. In progress  3. Patient demonstrates independence with body mechanics. In progress  4. Patient will report pain of 5/10 at worst, on 0-10 pain scale, with all activity in progress  5 Patient demonstrates increased B knee flexion ROM by 5 degrees or greater to improve tolerance to functional activities pain free. In progress  6.  Patient demonstrates increased strength BLE's by 1/3 muscle grade or greater to improve tolerance to functional activities pain free. In progess    Plan     Outpatient physical therapy 1-2 times weekly to include: pt ed, hep, therapeutic exercises, neuromuscular re-education/ balance exercises, manual therapy, joint mobilizations, aquatic therapy and modalities prn. Cont PT for  10-12 weeks. Pt may be seen by PTA as part of the rehabilitation team.     Oscar Trevino, PT   12/11/2018

## 2018-12-20 ENCOUNTER — DOCUMENTATION ONLY (OUTPATIENT)
Dept: REHABILITATION | Facility: HOSPITAL | Age: 43
End: 2018-12-20

## 2018-12-20 NOTE — PROGRESS NOTES
Physical Therapy: No show/Cancellation of Visit  Date: 12/20/2018    Patient was a no show to today's PT appointment. Patient does not currently have a future PT appointment scheduled.    Cancel: 3  No show: 2    Therapist: Rhianna Durham PTA         DISPLAY PLAN FREE TEXT

## 2019-07-17 ENCOUNTER — OFFICE VISIT (OUTPATIENT)
Dept: URGENT CARE | Facility: CLINIC | Age: 44
End: 2019-07-17
Payer: MEDICAID

## 2019-07-17 VITALS
RESPIRATION RATE: 18 BRPM | BODY MASS INDEX: 45.99 KG/M2 | TEMPERATURE: 99 F | HEART RATE: 88 BPM | SYSTOLIC BLOOD PRESSURE: 136 MMHG | OXYGEN SATURATION: 97 % | WEIGHT: 293 LBS | HEIGHT: 67 IN | DIASTOLIC BLOOD PRESSURE: 81 MMHG

## 2019-07-17 DIAGNOSIS — M79.89 SWELLING OF LEFT INDEX FINGER: ICD-10-CM

## 2019-07-17 DIAGNOSIS — M79.645 FINGER PAIN, LEFT: Primary | ICD-10-CM

## 2019-07-17 PROCEDURE — 73140 X-RAY EXAM OF FINGER(S): CPT | Mod: LT,S$GLB,, | Performed by: RADIOLOGY

## 2019-07-17 PROCEDURE — 99204 OFFICE O/P NEW MOD 45 MIN: CPT | Mod: S$GLB,,, | Performed by: PHYSICIAN ASSISTANT

## 2019-07-17 PROCEDURE — 73140 XR FINGER 2 OR MORE VIEWS: ICD-10-PCS | Mod: LT,S$GLB,, | Performed by: RADIOLOGY

## 2019-07-17 PROCEDURE — 99204 PR OFFICE/OUTPT VISIT, NEW, LEVL IV, 45-59 MIN: ICD-10-PCS | Mod: S$GLB,,, | Performed by: PHYSICIAN ASSISTANT

## 2019-07-17 RX ORDER — KETOROLAC TROMETHAMINE 30 MG/ML
30 INJECTION, SOLUTION INTRAMUSCULAR; INTRAVENOUS
Status: COMPLETED | OUTPATIENT
Start: 2019-07-17 | End: 2019-07-17

## 2019-07-17 RX ORDER — BETAMETHASONE SODIUM PHOSPHATE AND BETAMETHASONE ACETATE 3; 3 MG/ML; MG/ML
9 INJECTION, SUSPENSION INTRA-ARTICULAR; INTRALESIONAL; INTRAMUSCULAR; SOFT TISSUE
Status: COMPLETED | OUTPATIENT
Start: 2019-07-17 | End: 2019-07-17

## 2019-07-17 RX ORDER — CLINDAMYCIN HYDROCHLORIDE 300 MG/1
300 CAPSULE ORAL 4 TIMES DAILY
Qty: 28 CAPSULE | Refills: 0 | Status: SHIPPED | OUTPATIENT
Start: 2019-07-17 | End: 2019-07-24

## 2019-07-17 RX ADMIN — BETAMETHASONE SODIUM PHOSPHATE AND BETAMETHASONE ACETATE 9 MG: 3; 3 INJECTION, SUSPENSION INTRA-ARTICULAR; INTRALESIONAL; INTRAMUSCULAR; SOFT TISSUE at 03:07

## 2019-07-17 RX ADMIN — KETOROLAC TROMETHAMINE 30 MG: 30 INJECTION, SOLUTION INTRAMUSCULAR; INTRAVENOUS at 03:07

## 2019-07-17 NOTE — PATIENT INSTRUCTIONS
- Rest.    - Drink plenty of fluids.    - Tylenol or Ibuprofen as directed as needed for fever/pain. Avoid tylenol if you have a history of liver disease. Do not take ibuprofen if you have a history of GI bleeding, kidney disease, or if you take blood thinners.   - ibuprofen 600-800 mg every 6-8 hr for pain and inflammation.  DO NOT TAKE IBUPROFEN UNTIL 8 HR FROM NOW BECAUSE YOU HAD THE SHOT IN CLINIC TODAY.    - You received a steroid shot today.  This can elevate your blood pressure, elevate your blood sugar, water weight gain, nervous energy, redness to the face and dimpling of the skin where the shot goes in.   - Do not use steroids more than 3 times per year.   - If you have diabetes, please check you blood sugar frequently.  - If you have high blood pressure, please check your blood pressure frequently.     - Ice for 15-20 minutes at a time for the next 24-48 hours.    - Elevate when possible.     - You have been given an antibiotic to treat your condition today.    - Please complete the antibiotic as directed on the bottle.   - If you are female and on oral birth control pills, use additional methods to prevent pregnancy while on antibiotics and for one cycle after.   - you can take over-the-counter probiotics during and after antibiotic use to help preserve gut sofy and reduce gastrointestinal symptoms    - Follow up with your PCP or specialty clinic as directed in the next 1-2 days if not improved or as needed.  You can call [phone] to schedule an appointment with the appropriate provider.    - Go to the ER or seek medical attention immediately if you develop new or worsening symptoms or if no improvement in the next 1-3 days    - You must understand that you have received an Urgent Care treatment only and that you may be released before all of your medical problems are known or treated.   - You, the patient, will arrange for follow up care as instructed.   - If your condition worsens or fails to  improve we recommend that you receive another evaluation at the ER immediately or contact your PCP to discuss your concerns or return here.

## 2019-07-17 NOTE — PROGRESS NOTES
"Subjective:       Patient ID: Hadley Cano is a 43 y.o. female.    Vitals:  height is 5' 7" (1.702 m) and weight is 199.6 kg (440 lb) (abnormal). Her temperature is 99.1 °F (37.3 °C). Her blood pressure is 136/81 and her pulse is 88. Her respiration is 18 and oxygen saturation is 97%.     Chief Complaint: Hand Pain (left)    Patient presents with left index finger pain and swelling. She states that this morning she noticed that her finger had some tingling and mild pain. Finger is now swollen and hurts to move, she has not taken any medication or used any creams to relieve it.  Denies any injury or trauma.  No previous lesions, cuts, puncture wound, or scrapes.  Denies history of gout.    Hand Pain    The incident occurred 6 to 12 hours ago. Incident location: in the car. Pertinent negatives include no chest pain. She has tried nothing for the symptoms.       Constitution: Negative for chills, fatigue and fever.   HENT: Negative for congestion and sore throat.    Neck: Negative for painful lymph nodes.   Cardiovascular: Negative for chest pain and leg swelling.   Eyes: Negative for double vision and blurred vision.   Respiratory: Negative for cough and shortness of breath.    Gastrointestinal: Negative for nausea, vomiting and diarrhea.   Genitourinary: Negative for dysuria, frequency, urgency and history of kidney stones.   Musculoskeletal: Positive for pain, joint pain, joint swelling and abnormal ROM of joint. Negative for trauma, arthritis, gout, back pain, muscle cramps, muscle ache and history of spine disorder.   Skin: Negative for color change, pale, rash and bruising.   Allergic/Immunologic: Negative for seasonal allergies.   Neurological: Negative for dizziness, history of vertigo, light-headedness, passing out and headaches.   Hematologic/Lymphatic: Negative for swollen lymph nodes.   Psychiatric/Behavioral: Negative for nervous/anxious, sleep disturbance and depression. The patient is not " nervous/anxious.        Objective:      Physical Exam   Constitutional: She is oriented to person, place, and time. She appears well-developed and well-nourished. She is cooperative.  Non-toxic appearance. She does not appear ill. No distress.   HENT:   Head: Normocephalic and atraumatic. Head is without abrasion, without contusion and without laceration.   Right Ear: Hearing, tympanic membrane, external ear and ear canal normal. No hemotympanum.   Left Ear: Hearing, tympanic membrane, external ear and ear canal normal. No hemotympanum.   Nose: Nose normal. No mucosal edema, rhinorrhea or nasal deformity. No epistaxis. Right sinus exhibits no maxillary sinus tenderness and no frontal sinus tenderness. Left sinus exhibits no maxillary sinus tenderness and no frontal sinus tenderness.   Mouth/Throat: Uvula is midline, oropharynx is clear and moist and mucous membranes are normal. No trismus in the jaw. Normal dentition. No uvula swelling. No posterior oropharyngeal erythema.   Eyes: Pupils are equal, round, and reactive to light. Conjunctivae, EOM and lids are normal. Right eye exhibits no discharge. Left eye exhibits no discharge. No scleral icterus.   Sclera clear bilat   Neck: Trachea normal, normal range of motion, full passive range of motion without pain and phonation normal. Neck supple. No spinous process tenderness and no muscular tenderness present. No neck rigidity. No tracheal deviation present.   Cardiovascular: Normal rate, regular rhythm, normal heart sounds, intact distal pulses and normal pulses.   Pulmonary/Chest: Effort normal and breath sounds normal. No respiratory distress.   Abdominal: Soft. Normal appearance and bowel sounds are normal. She exhibits no distension, no pulsatile midline mass and no mass. There is no tenderness.   Musculoskeletal: She exhibits no edema or deformity.        Left hand: She exhibits decreased range of motion, tenderness, bony tenderness and swelling. She exhibits  normal two-point discrimination, normal capillary refill, no deformity and no laceration. Normal sensation noted. Normal strength noted.        Hands:  Neurological: She is alert and oriented to person, place, and time. She has normal strength. No cranial nerve deficit or sensory deficit. She exhibits normal muscle tone. She displays no seizure activity. Coordination normal. GCS eye subscore is 4. GCS verbal subscore is 5. GCS motor subscore is 6.   Skin: Skin is warm, dry and intact. Capillary refill takes less than 2 seconds. No abrasion, no bruising, no burn, no ecchymosis and no laceration noted. She is not diaphoretic. No pallor.   Psychiatric: She has a normal mood and affect. Her speech is normal and behavior is normal. Judgment and thought content normal. Cognition and memory are normal.   Nursing note and vitals reviewed.        Xr Finger 2 Or More Views    Result Date: 7/17/2019  EXAMINATION: XR FINGER 2 OR MORE VIEWS CLINICAL HISTORY: Left index finger pain and swelling;  Pain in left finger(s) TECHNIQUE: Three views of the left index finger were obtained. COMPARISON: None FINDINGS: The bones are intact.  There is no evidence for acute fracture or bone destruction.  There is no evidence for dislocation.  No bony erosions are identified.  There is soft tissue swelling.  No radiopaque soft tissue foreign bodies are identified.     Soft tissue swelling without evidence for acute fracture, bone destruction, dislocation, or radiopaque soft tissue foreign bodies. Electronically signed by: Isrrael Galloway MD Date:    07/17/2019 Time:    15:31    Discussed with patient the differential diagnosis include tendinitis, gout, pseudogout, infectious tenosynovitis, sprain, arthritis.  Discussed that my main concern was flexor suppurative tenosynovitis, although based on exam, it does not appear to be at this time.  Based on history and physical, I also do not see a source for origin of infection.  Patient is none the less  with significant pain and swelling. Will treat with NSAID/steroid in clinic and will empirically begin antibiotic because of location and symptoms.  I gave strict ER precautions to go to the ER if she develops new or worsening symptoms or if she develops no improvement symptoms with the shots and beginning of antibiotics.  Patient agrees with plan expressed understanding.    Assessment:       1. Finger pain, left    2. Swelling of left index finger        Plan:         Finger pain, left  -     XR FINGER 2 OR MORE VIEWS; Future; Expected date: 07/17/2019  -     betamethasone acetate-betamethasone sodium phosphate injection 9 mg  -     ketorolac injection 30 mg  -     clindamycin (CLEOCIN) 300 MG capsule; Take 1 capsule (300 mg total) by mouth 4 (four) times daily. for 7 days  Dispense: 28 capsule; Refill: 0    Swelling of left index finger  -     betamethasone acetate-betamethasone sodium phosphate injection 9 mg  -     ketorolac injection 30 mg  -     clindamycin (CLEOCIN) 300 MG capsule; Take 1 capsule (300 mg total) by mouth 4 (four) times daily. for 7 days  Dispense: 28 capsule; Refill: 0      Patient Instructions   - Rest.    - Drink plenty of fluids.    - Tylenol or Ibuprofen as directed as needed for fever/pain. Avoid tylenol if you have a history of liver disease. Do not take ibuprofen if you have a history of GI bleeding, kidney disease, or if you take blood thinners.   - ibuprofen 600-800 mg every 6-8 hr for pain and inflammation.  DO NOT TAKE IBUPROFEN UNTIL 8 HR FROM NOW BECAUSE YOU HAD THE SHOT IN CLINIC TODAY.    - You received a steroid shot today.  This can elevate your blood pressure, elevate your blood sugar, water weight gain, nervous energy, redness to the face and dimpling of the skin where the shot goes in.   - Do not use steroids more than 3 times per year.   - If you have diabetes, please check you blood sugar frequently.  - If you have high blood pressure, please check your blood pressure  frequently.     - Ice for 15-20 minutes at a time for the next 24-48 hours.    - Elevate when possible.     - You have been given an antibiotic to treat your condition today.    - Please complete the antibiotic as directed on the bottle.   - If you are female and on oral birth control pills, use additional methods to prevent pregnancy while on antibiotics and for one cycle after.   - you can take over-the-counter probiotics during and after antibiotic use to help preserve gut sofy and reduce gastrointestinal symptoms    - Follow up with your PCP or specialty clinic as directed in the next 1-2 days if not improved or as needed.  You can call [phone] to schedule an appointment with the appropriate provider.    - Go to the ER or seek medical attention immediately if you develop new or worsening symptoms or if no improvement in the next 1-3 days    - You must understand that you have received an Urgent Care treatment only and that you may be released before all of your medical problems are known or treated.   - You, the patient, will arrange for follow up care as instructed.   - If your condition worsens or fails to improve we recommend that you receive another evaluation at the ER immediately or contact your PCP to discuss your concerns or return here.

## 2019-07-24 ENCOUNTER — HOSPITAL ENCOUNTER (EMERGENCY)
Facility: HOSPITAL | Age: 44
Discharge: HOME | End: 2019-07-24
Attending: EMERGENCY MEDICINE
Payer: MEDICAID

## 2019-07-24 VITALS
SYSTOLIC BLOOD PRESSURE: 149 MMHG | OXYGEN SATURATION: 98 % | RESPIRATION RATE: 18 BRPM | HEART RATE: 73 BPM | HEIGHT: 67 IN | TEMPERATURE: 99 F | BODY MASS INDEX: 45.99 KG/M2 | DIASTOLIC BLOOD PRESSURE: 88 MMHG | WEIGHT: 293 LBS

## 2019-07-24 DIAGNOSIS — M25.569 KNEE PAIN: ICD-10-CM

## 2019-07-24 DIAGNOSIS — M25.562 ACUTE PAIN OF BOTH KNEES: Primary | ICD-10-CM

## 2019-07-24 DIAGNOSIS — E66.01 MORBID OBESITY: ICD-10-CM

## 2019-07-24 DIAGNOSIS — M25.561 ACUTE PAIN OF BOTH KNEES: Primary | ICD-10-CM

## 2019-07-24 DIAGNOSIS — M79.606 LEG PAIN: ICD-10-CM

## 2019-07-24 PROCEDURE — 63600175 PHARM REV CODE 636 W HCPCS: Performed by: PHYSICIAN ASSISTANT

## 2019-07-24 PROCEDURE — 99284 EMERGENCY DEPT VISIT MOD MDM: CPT | Mod: 25

## 2019-07-24 PROCEDURE — 96372 THER/PROPH/DIAG INJ SC/IM: CPT

## 2019-07-24 RX ORDER — KETOROLAC TROMETHAMINE 30 MG/ML
30 INJECTION, SOLUTION INTRAMUSCULAR; INTRAVENOUS
Status: COMPLETED | OUTPATIENT
Start: 2019-07-24 | End: 2019-07-24

## 2019-07-24 RX ADMIN — KETOROLAC TROMETHAMINE 30 MG: 30 INJECTION, SOLUTION INTRAMUSCULAR; INTRAVENOUS at 04:07

## 2019-07-24 NOTE — ED TRIAGE NOTES
Patient reports bilateral leg pain that began Sunday night.  Denies trauma.  Patient also reports bilateral ankle swelling.  Patient  Reports pain in right leg is worse than the left leg.

## 2019-07-24 NOTE — DISCHARGE INSTRUCTIONS
Please follow up with family doctor this week.  Please increase exercise at home.  He may use an ice pack to the region.  Tylenol or Motrin for pain.

## 2019-07-24 NOTE — ED PROVIDER NOTES
Encounter Date: 7/24/2019    SCRIBE #1 NOTE: I, Edgardo Lee, am scribing for, and in the presence of,  Afsaneh Irving PA-C. I have scribed the following portions of the note - Other sections scribed: HPI, ROS, and PE.       History     Chief Complaint   Patient presents with    Leg Pain     pt reports bilateral leg pain since Sunday, denies injury. denies taking OTC meds.      CC: Leg Pain    HPI:  This is a morbidly obese 43 y.o. former smoking female, with a PMHx of arthritis is presenting presenting to the Emergency Department with a cc of a constant bilaterally knee and lower leg pain that is worse in the right x4 days.  Patient states that the pain starts at her knees and radiates down.  Denies any associated paresthesia focal weakness or extremity.  She denies any injury trauma or fall.  Patient has not noticed any redness or swelling. No prior history of PE or DVT.  No chest pain palpitations or shortness of breath.  She took ibuprofen yesterday for her pain. No prior injury or surgery to the site.          The history is provided by the patient.     Review of patient's allergies indicates:   Allergen Reactions    [place]      Past Medical History:   Diagnosis Date    Arthritis      History reviewed. No pertinent surgical history.  History reviewed. No pertinent family history.  Social History     Tobacco Use    Smoking status: Former Smoker     Types: Cigarettes    Smokeless tobacco: Never Used   Substance Use Topics    Alcohol use: No    Drug use: No     Review of Systems   Constitutional: Negative for chills and fever.   HENT: Negative for congestion.    Respiratory: Negative for shortness of breath.    Cardiovascular: Negative for chest pain.   Musculoskeletal: Positive for arthralgias and myalgias. Negative for joint swelling.   Skin: Negative for rash.   Neurological: Negative for weakness and numbness.   Hematological: Does not bruise/bleed easily.   Psychiatric/Behavioral: Negative for  confusion.       Physical Exam     Initial Vitals [07/24/19 1448]   BP Pulse Resp Temp SpO2   137/75 87 20 98.7 °F (37.1 °C) 98 %      MAP       --         Physical Exam    Vitals reviewed.  Constitutional: She appears well-developed and well-nourished. She is not diaphoretic. She is Obese . No distress.   Exam limited due to body habitus   HENT:   Head: Normocephalic and atraumatic.   Mouth/Throat: Oropharynx is clear and moist.   Eyes: Conjunctivae and EOM are normal.   Neck: Neck supple.   Cardiovascular: Normal rate, regular rhythm, normal heart sounds, intact distal pulses and normal pulses.   Pulmonary/Chest: Breath sounds normal.   Musculoskeletal:        Right knee: She exhibits no swelling. Tenderness (Diffuse) found.   Diffuse tenderness due to palpation of knee and lower leg bilaterally   Neurological: She is alert and oriented to person, place, and time.   Skin: Skin is warm and dry.         ED Course   Procedures  Labs Reviewed - No data to display       Imaging Results          US Lower Extremity Veins Bilateral (Final result)  Result time 07/24/19 16:48:37    Final result by Wyatt Lutz MD (07/24/19 16:48:37)                 Impression:      No evidence of deep venous thrombosis in either lower extremity.      Electronically signed by: Wyatt Lutz MD  Date:    07/24/2019  Time:    16:48             Narrative:    EXAMINATION:  US LOWER EXTREMITY VEINS BILATERAL    CLINICAL HISTORY:  Pain in leg, unspecified    TECHNIQUE:  Duplex and color flow Doppler and dynamic compression was performed of the bilateral lower extremity veins was performed.    COMPARISON:  None    FINDINGS:  Right thigh veins: The common femoral, femoral, popliteal, upper greater saphenous, and deep femoral veins are patent and free of thrombus. The veins are normally compressible and have normal phasic flow and augmentation response.    Right calf veins: The visualized calf veins are patent.    Left thigh veins: The common  femoral, femoral, popliteal, upper greater saphenous, and deep femoral veins are patent and free of thrombus. The veins are normally compressible and have normal phasic flow and augmentation response.    Left calf veins: The visualized calf veins are patent.    Miscellaneous: None                               X-Ray Knee 3 View Bilateral (Final result)  Result time 07/24/19 15:57:52    Final result by Ml Pérez MD (07/24/19 15:57:52)                 Impression:      There is no evidence acute injury of either knee.  There is a small exostosis seen in association with the left proximal tibia.      Electronically signed by: Ml Pérez MD  Date:    07/24/2019  Time:    15:57             Narrative:    EXAMINATION:  XR KNEE 3 VIEW BILATERAL    CLINICAL HISTORY:  Pain in unspecified knee    TECHNIQUE:  AP, lateral, and Merchant views of both knees were performed.    COMPARISON:  None    FINDINGS:  There is no evidence fracture or malalignment.  There is a small exostosis seen in association with the left proximal tibia.  The adjacent soft tissues are unremarkable.                                       APC / Resident Notes:   Patient was seen in the ER promptly upon arrival.  She is afebrile, no acute distress. Physical examination was significantly limited due to body habitus.  Patient appears to have some diffuse tenderness on palpation to bilateral knee, lower extremity.  No associated swelling, erythema.  Range of motion of the knees or fully intact.  Distal pulses intact. No concerns for septic arthritis at this time.    Ultrasound does not reveal any acute abnormality.  No DVT.  X-rays do not show fracture or dislocation.    Upon reassessment patient is resting comfortably.  She has had alleviation with the Toradol.  Patient's symptoms likely secondary to overuse, wherein tear.  Patient is morbidly obese.  Did have an extensive conversation with patient regarding weight loss.  I do feel that there is a  component of weight worsening her knee pain. She was advised to follow up with family doctor this week.  Advised Tylenol or Motrin for pain as needed.  She is stable for discharge and close follow-up.       Scribe Attestation:   Scribe #1: I performed the above scribed service and the documentation accurately describes the services I performed. I attest to the accuracy of the note.               Clinical Impression:     1. Acute pain of both knees    2. Knee pain    3. Leg pain    4. Morbid obesity            Disposition:   Disposition: Discharged  Condition: Stable       Scribe attestation: I, Afsaneh Duque PA-C, personally performed the services described in this documentation. All medical record entries made by the scribe were at my direction and in my presence.  I have reviewed the chart and agree that the record reflects my personal performance and is accurate and complete.                   Afsaneh Duque PA-C  07/24/19 1252

## 2019-08-14 ENCOUNTER — OFFICE VISIT (OUTPATIENT)
Dept: URGENT CARE | Facility: CLINIC | Age: 44
End: 2019-08-14
Payer: MEDICAID

## 2019-08-14 VITALS
BODY MASS INDEX: 45.99 KG/M2 | OXYGEN SATURATION: 97 % | DIASTOLIC BLOOD PRESSURE: 77 MMHG | HEIGHT: 67 IN | SYSTOLIC BLOOD PRESSURE: 121 MMHG | TEMPERATURE: 100 F | HEART RATE: 79 BPM | RESPIRATION RATE: 16 BRPM | WEIGHT: 293 LBS

## 2019-08-14 DIAGNOSIS — G89.29 CHRONIC PAIN OF RIGHT KNEE: Primary | ICD-10-CM

## 2019-08-14 DIAGNOSIS — M25.561 CHRONIC PAIN OF RIGHT KNEE: Primary | ICD-10-CM

## 2019-08-14 PROCEDURE — 99214 PR OFFICE/OUTPT VISIT, EST, LEVL IV, 30-39 MIN: ICD-10-PCS | Mod: S$GLB,,, | Performed by: PHYSICIAN ASSISTANT

## 2019-08-14 PROCEDURE — 99214 OFFICE O/P EST MOD 30 MIN: CPT | Mod: S$GLB,,, | Performed by: PHYSICIAN ASSISTANT

## 2019-08-14 RX ORDER — IBUPROFEN 800 MG/1
800 TABLET ORAL 3 TIMES DAILY
Qty: 30 TABLET | Refills: 0 | Status: SHIPPED | OUTPATIENT
Start: 2019-08-15 | End: 2019-08-25

## 2019-08-14 RX ORDER — KETOROLAC TROMETHAMINE 30 MG/ML
60 INJECTION, SOLUTION INTRAMUSCULAR; INTRAVENOUS
Status: COMPLETED | OUTPATIENT
Start: 2019-08-14 | End: 2019-08-14

## 2019-08-14 RX ADMIN — KETOROLAC TROMETHAMINE 60 MG: 30 INJECTION, SOLUTION INTRAMUSCULAR; INTRAVENOUS at 03:08

## 2019-08-14 NOTE — PROGRESS NOTES
"Subjective:       Patient ID: Hadley Cano is a 43 y.o. female.    Vitals:  height is 5' 7" (1.702 m) and weight is 199.6 kg (440 lb) (abnormal). Her oral temperature is 100 °F (37.8 °C). Her blood pressure is 121/77 and her pulse is 79. Her respiration is 16 and oxygen saturation is 97%.     Chief Complaint: Knee Pain    Pt with a history of chronic bilat knee pain states she has pain in her right knee with radiation to her right foot and back that has worsened over the past 4 days.  She previously saw an orthopedist and did PT for her knee pain about 1 year ago.  States that mobic and naproxen gives her headaches but ibuprofen is fine. She was seen in the ER about a month ago for the same thing and was told to increase exercise but "don't overdo it."    Knee Pain    Incident onset: 7days. The incident occurred at home. The injury mechanism is unknown. The pain is present in the right knee, right ankle and right foot (back ). The quality of the pain is described as shooting and burning. The pain is at a severity of 10/10. The pain is severe. The pain has been constant since onset. Associated symptoms include an inability to bear weight. The symptoms are aggravated by weight bearing and movement. She has tried acetaminophen for the symptoms. The treatment provided no relief.       Constitution: Negative for chills, fatigue and fever.   HENT: Negative for congestion and sore throat.    Neck: Negative for painful lymph nodes.   Cardiovascular: Negative for chest pain and leg swelling.   Eyes: Negative for double vision and blurred vision.   Respiratory: Negative for cough and shortness of breath.    Gastrointestinal: Negative for nausea, vomiting and diarrhea.   Genitourinary: Negative for dysuria, frequency, urgency and history of kidney stones.   Musculoskeletal: Positive for joint pain. Negative for joint swelling, muscle cramps and muscle ache.   Skin: Negative for color change, pale, rash and bruising. "   Allergic/Immunologic: Negative for seasonal allergies.   Neurological: Negative for dizziness, history of vertigo, light-headedness, passing out and headaches.   Hematologic/Lymphatic: Negative for swollen lymph nodes.   Psychiatric/Behavioral: Negative for nervous/anxious, sleep disturbance and depression. The patient is not nervous/anxious.        Objective:      Physical Exam   Constitutional: She is oriented to person, place, and time. She appears well-developed and well-nourished. She is cooperative.  Non-toxic appearance. She does not appear ill. No distress.   Morbidly obese   HENT:   Head: Normocephalic and atraumatic.   Right Ear: Hearing, tympanic membrane, external ear and ear canal normal.   Left Ear: Hearing, tympanic membrane, external ear and ear canal normal.   Nose: Nose normal. No mucosal edema, rhinorrhea or nasal deformity. No epistaxis. Right sinus exhibits no maxillary sinus tenderness and no frontal sinus tenderness. Left sinus exhibits no maxillary sinus tenderness and no frontal sinus tenderness.   Mouth/Throat: Uvula is midline, oropharynx is clear and moist and mucous membranes are normal. No trismus in the jaw. Normal dentition. No uvula swelling. No posterior oropharyngeal erythema.   Eyes: Conjunctivae and lids are normal. Right eye exhibits no discharge. Left eye exhibits no discharge. No scleral icterus.   Sclera clear bilat   Neck: Trachea normal, normal range of motion, full passive range of motion without pain and phonation normal. Neck supple.   Cardiovascular: Normal rate, regular rhythm, normal heart sounds, intact distal pulses and normal pulses.   Pulmonary/Chest: Effort normal and breath sounds normal. No respiratory distress.   Abdominal: Soft. Normal appearance and bowel sounds are normal. She exhibits no distension, no pulsatile midline mass and no mass. There is no tenderness.   Musculoskeletal: She exhibits no edema or deformity.        Right knee: She exhibits  decreased range of motion. She exhibits no swelling and no deformity. Tenderness found. Medial joint line and lateral joint line tenderness noted.   Neurological: She is alert and oriented to person, place, and time. She exhibits normal muscle tone. Coordination normal.   Skin: Skin is warm, dry and intact. She is not diaphoretic. No pallor.   Psychiatric: She has a normal mood and affect. Her speech is normal and behavior is normal. Judgment and thought content normal. Cognition and memory are normal.   Nursing note and vitals reviewed.        Assessment:       1. Chronic pain of right knee        Plan:         Chronic pain of right knee  -     Ambulatory referral to Orthopedics  -     ketorolac injection 60 mg  -     ibuprofen (ADVIL,MOTRIN) 800 MG tablet; Take 1 tablet (800 mg total) by mouth 3 (three) times daily. for 10 days  Dispense: 30 tablet; Refill: 0      Patient Instructions   General Discharge Instructions   You were given a Toradol shot today in clinic. Do not take any anti-inflammatories (ibuprofen, naproxen, meloxicam) for the next 8 hours.    You must understand that you've received an Urgent Care treatment only and that you may be released before all your medical problems are known or treated. You, the patient, will arrange for follow up care as instructed.  Follow up with your PCP or specialty clinic as directed in the next 1-2 weeks if not improved or as needed.  You can call [phone] to schedule an appointment with the appropriate provider.  If your condition worsens we recommend that you receive another evaluation at the emergency room immediately or contact your primary medical clinics after hours call service to discuss your concerns.  Please return here or go to the Emergency Department for any concerns or worsening of condition.      Knee Pain of Uncertain Cause    There are several common causes for knee pain. These can include:  · A sprain of the ligaments that support the  joint  · An injury to the cartilage lining of the joint  · Arthritis from wear-and-tear or inflammation  There are other causes as well. There may also be swelling, reduced movement of the knee joint, and pain with walking. A definite diagnosis will still need to be made. If your symptoms do not improve, further follow-up and testing may be needed.  Home care  · Stay off the injured leg as much as possible until pain improves.  · Apply an ice pack over the injured area for 15 to 20 minutes every 3 to 6 hours. You should do this for the first 24 to 48 hours. You can make an ice pack by filling a plastic bag that seals at the top with ice cubes and then wrapping it with a thin towel. Continue to use ice packs for relief of pain and swelling as needed. As the ice melts, be careful to avoid getting your wrap, splint, or cast wet. After 48 hours, apply heat (warm shower or warm bath) for 15 to 20 minutes several times a day, or alternate ice and heat. If you have to wear a hook-and-loop knee brace, you can open it to apply the ice pack, or heat, directly to the knee. Never put ice directly on the skin. Always wrap the ice in a towel or other type of cloth.  · You may use over-the-counter pain medicine to control pain, unless another pain medicine was prescribed. If you have chronic liver or kidney disease or ever had a stomach ulcer or GI bleeding, talk with your healthcare provider using these medicines.  · If crutches or a walker have been recommended, do not put weight on the injured leg until you can do so without pain. Check with your healthcare provider before returning to sports or full work duties.  · If you have a hook-and-loop knee brace, you can remove it to bathe and sleep, unless told otherwise.  Follow-up care  Follow up with your healthcare provider as advised. This is usually within 1-2 weeks.  If X-rays were taken, you will be told of any new findings that may affect your care.  Call 911  Call 911 if you  have:  · Shortness of breath  · Chest pain  When to seek medical advice  Call your healthcare provider right away if any of these occur:  · Toes or foot becomes swollen, cold, blue, numb, or tingly  · Pain or swelling spreads over the knee or calf  · Warmth or redness appears over the knee or calf  · Other joints become painful  · Rash appears  · Fever of 100.4°F (38°C) or above lasting for 24 to 48 hours  Date Last Reviewed: 11/23/2015 © 2000-2017 [employer]. [address] [place] [address], [place] [id number]. All rights reserved. This information is not intended as a substitute for professional medical care. Always follow your healthcare professional's instructions.

## 2019-08-14 NOTE — PATIENT INSTRUCTIONS
General Discharge Instructions   You were given a Toradol shot today in clinic. Do not take any anti-inflammatories (ibuprofen, naproxen, meloxicam) for the next 8 hours.    You must understand that you've received an Urgent Care treatment only and that you may be released before all your medical problems are known or treated. You, the patient, will arrange for follow up care as instructed.  Follow up with your PCP or specialty clinic as directed in the next 1-2 weeks if not improved or as needed.  You can call [phone] to schedule an appointment with the appropriate provider.  If your condition worsens we recommend that you receive another evaluation at the emergency room immediately or contact your primary medical clinics after hours call service to discuss your concerns.  Please return here or go to the Emergency Department for any concerns or worsening of condition.      Knee Pain of Uncertain Cause    There are several common causes for knee pain. These can include:  · A sprain of the ligaments that support the joint  · An injury to the cartilage lining of the joint  · Arthritis from wear-and-tear or inflammation  There are other causes as well. There may also be swelling, reduced movement of the knee joint, and pain with walking. A definite diagnosis will still need to be made. If your symptoms do not improve, further follow-up and testing may be needed.  Home care  · Stay off the injured leg as much as possible until pain improves.  · Apply an ice pack over the injured area for 15 to 20 minutes every 3 to 6 hours. You should do this for the first 24 to 48 hours. You can make an ice pack by filling a plastic bag that seals at the top with ice cubes and then wrapping it with a thin towel. Continue to use ice packs for relief of pain and swelling as needed. As the ice melts, be careful to avoid getting your wrap, splint, or cast wet. After 48 hours, apply heat (warm shower or warm bath) for 15 to 20  minutes several times a day, or alternate ice and heat. If you have to wear a hook-and-loop knee brace, you can open it to apply the ice pack, or heat, directly to the knee. Never put ice directly on the skin. Always wrap the ice in a towel or other type of cloth.  · You may use over-the-counter pain medicine to control pain, unless another pain medicine was prescribed. If you have chronic liver or kidney disease or ever had a stomach ulcer or GI bleeding, talk with your healthcare provider using these medicines.  · If crutches or a walker have been recommended, do not put weight on the injured leg until you can do so without pain. Check with your healthcare provider before returning to sports or full work duties.  · If you have a hook-and-loop knee brace, you can remove it to bathe and sleep, unless told otherwise.  Follow-up care  Follow up with your healthcare provider as advised. This is usually within 1-2 weeks.  If X-rays were taken, you will be told of any new findings that may affect your care.  Call 911  Call 911 if you have:  · Shortness of breath  · Chest pain  When to seek medical advice  Call your healthcare provider right away if any of these occur:  · Toes or foot becomes swollen, cold, blue, numb, or tingly  · Pain or swelling spreads over the knee or calf  · Warmth or redness appears over the knee or calf  · Other joints become painful  · Rash appears  · Fever of 100.4°F (38°C) or above lasting for 24 to 48 hours  Date Last Reviewed: 11/23/2015  © 0588-0480 [employer] [address] [place] [address], [place] [address]. All rights reserved. This information is not intended as a substitute for professional medical care. Always follow your healthcare professional's instructions.

## 2019-10-18 ENCOUNTER — OFFICE VISIT (OUTPATIENT)
Dept: URGENT CARE | Facility: CLINIC | Age: 44
End: 2019-10-18
Payer: MEDICAID

## 2019-10-18 VITALS
DIASTOLIC BLOOD PRESSURE: 85 MMHG | TEMPERATURE: 98 F | OXYGEN SATURATION: 99 % | HEIGHT: 67 IN | BODY MASS INDEX: 45.99 KG/M2 | HEART RATE: 70 BPM | WEIGHT: 293 LBS | SYSTOLIC BLOOD PRESSURE: 143 MMHG | RESPIRATION RATE: 18 BRPM

## 2019-10-18 DIAGNOSIS — M25.632 DECREASED RANGE OF MOTION OF LEFT WRIST: ICD-10-CM

## 2019-10-18 DIAGNOSIS — M25.642 DECREASED RANGE OF MOTION OF FINGER OF LEFT HAND: ICD-10-CM

## 2019-10-18 DIAGNOSIS — M79.642 HAND PAIN, LEFT: Primary | ICD-10-CM

## 2019-10-18 DIAGNOSIS — S63.502A WRIST SPRAIN, LEFT, INITIAL ENCOUNTER: ICD-10-CM

## 2019-10-18 PROCEDURE — 99214 OFFICE O/P EST MOD 30 MIN: CPT | Mod: S$GLB,,, | Performed by: PHYSICIAN ASSISTANT

## 2019-10-18 PROCEDURE — 73130 XR HAND COMPLETE 3 VIEW LEFT: ICD-10-PCS | Mod: LT,S$GLB,, | Performed by: RADIOLOGY

## 2019-10-18 PROCEDURE — 73130 X-RAY EXAM OF HAND: CPT | Mod: LT,S$GLB,, | Performed by: RADIOLOGY

## 2019-10-18 PROCEDURE — 99214 PR OFFICE/OUTPT VISIT, EST, LEVL IV, 30-39 MIN: ICD-10-PCS | Mod: S$GLB,,, | Performed by: PHYSICIAN ASSISTANT

## 2019-10-18 RX ORDER — NAPROXEN 500 MG/1
500 TABLET ORAL 2 TIMES DAILY WITH MEALS
Qty: 20 TABLET | Refills: 0 | Status: SHIPPED | OUTPATIENT
Start: 2019-10-18 | End: 2019-10-28

## 2019-10-18 RX ORDER — KETOROLAC TROMETHAMINE 30 MG/ML
60 INJECTION, SOLUTION INTRAMUSCULAR; INTRAVENOUS
Status: COMPLETED | OUTPATIENT
Start: 2019-10-18 | End: 2019-10-18

## 2019-10-18 RX ADMIN — KETOROLAC TROMETHAMINE 60 MG: 30 INJECTION, SOLUTION INTRAMUSCULAR; INTRAVENOUS at 04:10

## 2019-10-18 NOTE — PROGRESS NOTES
"Subjective:       Patient ID: Hadley Cano is a 44 y.o. female.    Vitals:  height is 5' 7" (1.702 m) and weight is 199.6 kg (440 lb) (abnormal). Her temperature is 97.5 °F (36.4 °C). Her blood pressure is 143/85 (abnormal) and her pulse is 70. Her respiration is 18 and oxygen saturation is 99%.     Chief Complaint: Hand Pain    44-year-old female complaining of left hand pain for the past 2 days worsening, with pain on moving wrist and fingers.  Increased swelling to hand as well.  Denies any history of blood clots or blood clotting disorders.    Hand Pain    The incident occurred 6 to 12 hours ago. The incident occurred at home. The injury mechanism is unknown. The pain is present in the left wrist, left fingers and left hand. The quality of the pain is described as aching. The pain does not radiate. The pain is at a severity of 10/10. The pain is severe. The pain has been constant since the incident. Associated symptoms include tingling. Pertinent negatives include no chest pain. The symptoms are aggravated by movement, lifting and palpation. She has tried nothing for the symptoms. The treatment provided no relief.       Constitution: Negative for chills, fatigue and fever.   HENT: Negative for congestion and sore throat.    Neck: Negative for painful lymph nodes.   Cardiovascular: Negative for chest pain and leg swelling.   Eyes: Negative for double vision and blurred vision.   Respiratory: Negative for cough and shortness of breath.    Gastrointestinal: Negative for nausea, vomiting and diarrhea.   Genitourinary: Negative for dysuria, frequency, urgency and history of kidney stones.   Musculoskeletal: Positive for joint pain and joint swelling. Negative for muscle cramps and muscle ache.   Skin: Negative for color change, pale, rash and bruising.   Allergic/Immunologic: Negative for seasonal allergies.   Neurological: Negative for dizziness, history of vertigo, light-headedness, passing out and headaches. "   Hematologic/Lymphatic: Negative for swollen lymph nodes.   Psychiatric/Behavioral: Negative for nervous/anxious, sleep disturbance and depression. The patient is not nervous/anxious.        Objective:      Physical Exam   Constitutional: She is oriented to person, place, and time. She appears well-developed and well-nourished. She is cooperative.  Non-toxic appearance. She does not appear ill. No distress.   HENT:   Head: Normocephalic and atraumatic.   Right Ear: Hearing, tympanic membrane, external ear and ear canal normal.   Left Ear: Hearing, tympanic membrane, external ear and ear canal normal.   Nose: Nose normal. No mucosal edema, rhinorrhea or nasal deformity. No epistaxis. Right sinus exhibits no maxillary sinus tenderness and no frontal sinus tenderness. Left sinus exhibits no maxillary sinus tenderness and no frontal sinus tenderness.   Mouth/Throat: Uvula is midline, oropharynx is clear and moist and mucous membranes are normal. No trismus in the jaw. Normal dentition. No uvula swelling. No posterior oropharyngeal erythema.   Eyes: Conjunctivae and lids are normal. Right eye exhibits no discharge. Left eye exhibits no discharge. No scleral icterus.   Neck: Trachea normal, normal range of motion, full passive range of motion without pain and phonation normal. Neck supple.   Cardiovascular: Normal rate, regular rhythm, normal heart sounds, intact distal pulses and normal pulses.   Pulmonary/Chest: Effort normal and breath sounds normal. No respiratory distress.   Abdominal: Soft. Normal appearance and bowel sounds are normal. She exhibits no distension, no pulsatile midline mass and no mass. There is no tenderness.   Musculoskeletal: Normal range of motion. She exhibits no edema or deformity.        Hands:  Neurological: She is alert and oriented to person, place, and time. She exhibits normal muscle tone. Coordination normal.   Skin: Skin is warm, dry, intact, not diaphoretic and not pale.    Psychiatric: She has a normal mood and affect. Her speech is normal and behavior is normal. Judgment and thought content normal. Cognition and memory are normal.   Nursing note and vitals reviewed.        Assessment:       1. Hand pain, left    2. Decreased range of motion of left wrist    3. Decreased range of motion of finger of left hand    4. Wrist sprain, left, initial encounter        Plan:       wrist sprain vs vascular vs gout. No systemic symptoms  Given toradol in clinic, naproxen to start tomorrow. F/u with hand clinic Monday  If no relief with anti-inflammatories or if hand pain worsens or becomes swollen, pls go to ED for possible US    Hand pain, left  -     X-Ray Hand 3 view Left; Future; Expected date: 10/18/2019  -     ketorolac injection 60 mg  -     Ambulatory referral to Hand Surgery    Decreased range of motion of left wrist  -     X-Ray Hand 3 view Left; Future; Expected date: 10/18/2019  -     Ambulatory referral to Hand Surgery    Decreased range of motion of finger of left hand  -     X-Ray Hand 3 view Left; Future; Expected date: 10/18/2019  -     Ambulatory referral to Hand Surgery    Wrist sprain, left, initial encounter  -     naproxen (NAPROSYN) 500 MG tablet; Take 1 tablet (500 mg total) by mouth 2 (two) times daily with meals. for 10 days  Dispense: 20 tablet; Refill: 0          Patient Instructions   Do not use any anti-inflammatories today as you already have received a anti-inflammatory shot.    If pain does not improve with anti-inflammatories,  Please go to the emergency department.  If pain worsens or you get any swelling or chest pain or nausea vomiting, tingling or numbness in hands or fingers or any change in symptoms please go to the emergency department    Otherwise, please follow up with Hand Clinic on Monday.  I have put in a referral for you to see them urgently.  Number to call them is [phone].      Hand Sprain  A sprain is a stretching or tearing of the  ligaments that hold a joint together. There are no broken bones. Sprains take 3 to 6 weeks to heal. A sprained hand may be treated with a splint or elastic wrap for support.  Home care  · Keep your arm elevated to reduce pain and swelling. This is most important during the first 48 hours.  · Apply an ice pack over the injured area for 15 to 20 minutes every 3 to 6 hours. You should do this for the first 24 to 48 hours. You can make an ice pack by filling a plastic bag that seals at the top with ice cubes and then wrapping it with a thin towel. Continue the use of ice packs for relief of pain and swelling as needed. As the ice melts, be careful to avoid getting any wrap or splint wet. After 48 hours, apply heat (warm shower or warm bath) for 15 to 20 minutes several times a day, or alternate ice and heat.  · You may use over-the-counter pain medicine to control pain, unless another pain medicine was prescribed. If you have chronic liver or kidney disease or ever had a stomach ulcer or GI bleeding, talk with your healthcare provider before using these medicines.  · If you were given a splint or elastic wrap, wear it until your pain improves.  Follow-up care  Follow up with your healthcare provider as advised. Sometimes fractures dont show up on the first X-ray. Bruises and sprains can sometimes hurt as much as a fracture. These injuries can take time to heal completely. If your symptoms dont improve or they get worse, talk with your healthcare provider. You may need a repeat X-ray.  When to seek medical advice  Call your healthcare provider right away if any of these occur:  · Pain or swelling increases  · Fingers or hand becomes cold, blue, numb, or tingly  Date Last Reviewed: 11/20/2015  © 5124-6919 [employer]. [address] [place] [address], [place] [address]. All rights reserved. This information is not intended as a substitute for professional medical care. Always follow your healthcare professional's  instructions.

## 2019-10-18 NOTE — PATIENT INSTRUCTIONS
Do not use any anti-inflammatories today as you already have received a anti-inflammatory shot.    If pain does not improve with anti-inflammatories,  Please go to the emergency department.  If pain worsens or you get any swelling or chest pain or nausea vomiting, tingling or numbness in hands or fingers or any change in symptoms please go to the emergency department    Otherwise, please follow up with Hand Clinic on Monday.  I have put in a referral for you to see them urgently.  Number to call them is [phone].      Hand Sprain  A sprain is a stretching or tearing of the ligaments that hold a joint together. There are no broken bones. Sprains take 3 to 6 weeks to heal. A sprained hand may be treated with a splint or elastic wrap for support.  Home care  · Keep your arm elevated to reduce pain and swelling. This is most important during the first 48 hours.  · Apply an ice pack over the injured area for 15 to 20 minutes every 3 to 6 hours. You should do this for the first 24 to 48 hours. You can make an ice pack by filling a plastic bag that seals at the top with ice cubes and then wrapping it with a thin towel. Continue the use of ice packs for relief of pain and swelling as needed. As the ice melts, be careful to avoid getting any wrap or splint wet. After 48 hours, apply heat (warm shower or warm bath) for 15 to 20 minutes several times a day, or alternate ice and heat.  · You may use over-the-counter pain medicine to control pain, unless another pain medicine was prescribed. If you have chronic liver or kidney disease or ever had a stomach ulcer or GI bleeding, talk with your healthcare provider before using these medicines.  · If you were given a splint or elastic wrap, wear it until your pain improves.  Follow-up care  Follow up with your healthcare provider as advised. Sometimes fractures dont show up on the first X-ray. Bruises and sprains can sometimes hurt as much as a fracture. These injuries can  take time to heal completely. If your symptoms dont improve or they get worse, talk with your healthcare provider. You may need a repeat X-ray.  When to seek medical advice  Call your healthcare provider right away if any of these occur:  · Pain or swelling increases  · Fingers or hand becomes cold, blue, numb, or tingly  Date Last Reviewed: 11/20/2015  © 0635-8637 [employer]. [address] [place] [address], [place] [address]. All rights reserved. This information is not intended as a substitute for professional medical care. Always follow your healthcare professional's instructions.

## 2019-10-28 ENCOUNTER — HOSPITAL ENCOUNTER (EMERGENCY)
Facility: HOSPITAL | Age: 44
Discharge: HOME | End: 2019-10-28
Attending: EMERGENCY MEDICINE
Payer: MEDICAID

## 2019-10-28 VITALS
SYSTOLIC BLOOD PRESSURE: 125 MMHG | TEMPERATURE: 100 F | DIASTOLIC BLOOD PRESSURE: 79 MMHG | WEIGHT: 293 LBS | OXYGEN SATURATION: 97 % | BODY MASS INDEX: 45.99 KG/M2 | RESPIRATION RATE: 18 BRPM | HEIGHT: 67 IN | HEART RATE: 100 BPM

## 2019-10-28 DIAGNOSIS — J10.1 INFLUENZA B: Primary | ICD-10-CM

## 2019-10-28 DIAGNOSIS — R31.9 HEMATURIA, UNSPECIFIED TYPE: ICD-10-CM

## 2019-10-28 LAB
B-HCG UR QL: NEGATIVE
BACTERIA #/AREA URNS HPF: ABNORMAL /HPF
BILIRUB UR QL STRIP: NEGATIVE
CLARITY UR: CLEAR
COLOR UR: YELLOW
CTP QC/QA: YES
CTP QC/QA: YES
GLUCOSE UR QL STRIP: NEGATIVE
HGB UR QL STRIP: ABNORMAL
KETONES UR QL STRIP: NEGATIVE
LEUKOCYTE ESTERASE UR QL STRIP: NEGATIVE
MICROSCOPIC COMMENT: ABNORMAL
NITRITE UR QL STRIP: NEGATIVE
PH UR STRIP: 6 [PH] (ref 5–8)
POC MOLECULAR INFLUENZA A AGN: NEGATIVE
POC MOLECULAR INFLUENZA B AGN: POSITIVE
PROT UR QL STRIP: NEGATIVE
RBC #/AREA URNS HPF: 5 /HPF (ref 0–4)
SP GR UR STRIP: 1.02 (ref 1–1.03)
SQUAMOUS #/AREA URNS HPF: 2 /HPF
URN SPEC COLLECT METH UR: ABNORMAL
UROBILINOGEN UR STRIP-ACNC: NEGATIVE EU/DL
WBC #/AREA URNS HPF: 2 /HPF (ref 0–5)

## 2019-10-28 PROCEDURE — 87502 INFLUENZA DNA AMP PROBE: CPT

## 2019-10-28 PROCEDURE — 99284 EMERGENCY DEPT VISIT MOD MDM: CPT

## 2019-10-28 PROCEDURE — 81000 URINALYSIS NONAUTO W/SCOPE: CPT

## 2019-10-28 PROCEDURE — 81025 URINE PREGNANCY TEST: CPT | Performed by: PHYSICIAN ASSISTANT

## 2019-10-28 RX ORDER — CETIRIZINE HYDROCHLORIDE 10 MG/1
10 TABLET ORAL DAILY
Qty: 30 TABLET | Refills: 0 | Status: SHIPPED | OUTPATIENT
Start: 2019-10-28 | End: 2019-10-28 | Stop reason: SDUPTHER

## 2019-10-28 RX ORDER — AZELASTINE 1 MG/ML
1 SPRAY, METERED NASAL 2 TIMES DAILY
Qty: 30 ML | Refills: 0 | Status: SHIPPED | OUTPATIENT
Start: 2019-10-28 | End: 2019-10-28 | Stop reason: SDUPTHER

## 2019-10-28 RX ORDER — OSELTAMIVIR PHOSPHATE 75 MG/1
75 CAPSULE ORAL 2 TIMES DAILY
Qty: 10 CAPSULE | Refills: 0 | Status: SHIPPED | OUTPATIENT
Start: 2019-10-28 | End: 2019-10-28 | Stop reason: SDUPTHER

## 2019-10-28 RX ORDER — OSELTAMIVIR PHOSPHATE 75 MG/1
75 CAPSULE ORAL 2 TIMES DAILY
Qty: 10 CAPSULE | Refills: 0 | Status: SHIPPED | OUTPATIENT
Start: 2019-10-28 | End: 2019-11-02

## 2019-10-28 RX ORDER — AZELASTINE 1 MG/ML
1 SPRAY, METERED NASAL 2 TIMES DAILY
Qty: 30 ML | Refills: 0 | Status: SHIPPED | OUTPATIENT
Start: 2019-10-28 | End: 2020-10-27

## 2019-10-28 RX ORDER — CETIRIZINE HYDROCHLORIDE 10 MG/1
10 TABLET ORAL DAILY
Qty: 30 TABLET | Refills: 0 | Status: SHIPPED | OUTPATIENT
Start: 2019-10-28 | End: 2019-11-27

## 2019-10-29 NOTE — DISCHARGE INSTRUCTIONS
Drink lots of fluids, stay well hydrated. Tylenol/Ibuprofen as needed for discomfort; go back and forth between these two medications every 4 hrs as needed for temp greater than 100.4F, as needed for congestion/headache/body aches. Zyrtec and Astelin for congestion. Take Tamiflu as prescribed. Follow-up with primary care provider for reevaluation, further recommendations.Return to this ED if unable to treat fever, if symptoms persist or worsen despite treatment, if you begin with shortness of breath or difficulty breathing, if any other problems occur.

## 2019-10-29 NOTE — ED PROVIDER NOTES
Encounter Date: 10/28/2019       History     Chief Complaint   Patient presents with    Headache     pt reports frontal headache, dry cough, and lower body aches ongoing since Friday 10/25/19; pt denies any known fevers or sore throat; pt denies taking any medications for symptoms today     44-year-old female with history of arthritis with chief complaint nasal congestion, rhinorrhea, sinus pressure times 4 days.  No fever no chills. No cough.  No shortness of breath, dyspnea on exertion, no chest pain.  No neck pain or stiffness. No foul-smelling nasal drainage, no improvement then doubly worsening.  No abdominal pain. She does admit to some aches to her lower abdomen and bilateral lower extremities. She also admits to increased urinary frequency. No dysuria, hematuria, strong odor to urine.  No flank pain. No known sick contacts, no recent illness, no recent hospitalization, no recent antibiotics.  No other complaints.  Symptoms acute, constant, severity 5/10.  No alleviating or exacerbating factors.  No radiation of symptoms.        Review of patient's allergies indicates:   Allergen Reactions    [place]      Past Medical History:   Diagnosis Date    Arthritis      No past surgical history on file.  No family history on file.  Social History     Tobacco Use    Smoking status: Former Smoker     Types: Cigarettes    Smokeless tobacco: Never Used   Substance Use Topics    Alcohol use: No    Drug use: No     Review of Systems   Constitutional: Negative for appetite change, chills and fever.   HENT: Positive for congestion, rhinorrhea and sinus pressure. Negative for ear discharge, ear pain, sore throat and trouble swallowing.    Eyes: Negative.  Negative for discharge and redness.   Respiratory: Negative for cough, chest tightness and shortness of breath.    Cardiovascular: Negative for chest pain.   Gastrointestinal: Negative for abdominal pain, nausea and vomiting.   Endocrine: Negative.    Genitourinary:  Positive for frequency. Negative for difficulty urinating, dysuria, flank pain, hematuria, pelvic pain, urgency, vaginal bleeding, vaginal discharge and vaginal pain.   Musculoskeletal: Positive for myalgias. Negative for back pain, neck pain and neck stiffness.   Skin: Negative for rash.   Neurological: Negative for dizziness, weakness, light-headedness and headaches.   Hematological: Does not bruise/bleed easily.   Psychiatric/Behavioral: Negative.    All other systems reviewed and are negative.      Physical Exam     Initial Vitals [10/28/19 2005]   BP Pulse Resp Temp SpO2   125/79 100 18 99.8 °F (37.7 °C) 97 %      MAP       --         Physical Exam    Nursing note and vitals reviewed.  Constitutional: She appears well-developed and well-nourished. She is not diaphoretic. No distress.   Well-appearing and nontoxic.  Resting comfortably on exam table.   HENT:   Head: Normocephalic and atraumatic.   Nasal congestion, boggy nasal mucosa, turbinate edema bilaterally. No significant tenderness to percussion of facial sinuses.   Eyes: Conjunctivae and EOM are normal. Pupils are equal, round, and reactive to light.   Neck: Normal range of motion. Neck supple. No tracheal deviation present.   Cardiovascular: Intact distal pulses.   Pulmonary/Chest: No stridor. No respiratory distress.   Abdominal:   Abdomen overall soft, normal bowel sounds ×4.  No significant tenderness to palpation of any quadrant.  No rebound or guarding.  No palpable mass or distention.  No flank or CVA tenderness.  Negative Segovia sign.  No pain over McBurney's point.   Musculoskeletal: Normal range of motion. She exhibits no tenderness.   Lymphadenopathy:     She has no cervical adenopathy.   Neurological: She is alert and oriented to person, place, and time. GCS score is 15. GCS eye subscore is 4. GCS verbal subscore is 5. GCS motor subscore is 6.   Skin: Skin is warm and dry. Capillary refill takes less than 2 seconds.   Psychiatric: She has a  normal mood and affect. Her behavior is normal. Judgment and thought content normal.         ED Course   Procedures  Labs Reviewed   URINALYSIS, REFLEX TO URINE CULTURE - Abnormal; Notable for the following components:       Result Value    Occult Blood UA 2+ (*)     All other components within normal limits    Narrative:     Preferred Collection Type->Urine, Clean Catch   URINALYSIS MICROSCOPIC - Abnormal; Notable for the following components:    RBC, UA 5 (*)     Bacteria Few (*)     All other components within normal limits    Narrative:     Preferred Collection Type->Urine, Clean Catch   POCT INFLUENZA A/B MOLECULAR - Abnormal; Notable for the following components:    POC Molecular Influenza B Ag Positive (*)     All other components within normal limits   POCT URINE PREGNANCY          Imaging Results    None          Medical Decision Making:   Differential Diagnosis:   Viral illness, sinusitis, sinus pressure, rhinitis, influenza, UTI  ED Management:  Likely viral illness. Flu B positive.    Urinalysis without infection.  There is some blood in urine.  She has had hematuria in the past.  Patient will bring this up with her primary care provider.                       Clinical Impression:       ICD-10-CM ICD-9-CM   1. Influenza B J10.1 487.1   2. Hematuria, unspecified type R31.9 599.70         Disposition:   Disposition: Discharged  Condition: Stable                        Travis Gautam PA-C  10/28/19 5640

## 2019-12-09 ENCOUNTER — HOSPITAL ENCOUNTER (EMERGENCY)
Facility: HOSPITAL | Age: 44
Discharge: HOME | End: 2019-12-09
Attending: EMERGENCY MEDICINE
Payer: MEDICAID

## 2019-12-09 VITALS
HEART RATE: 85 BPM | HEIGHT: 67 IN | DIASTOLIC BLOOD PRESSURE: 60 MMHG | RESPIRATION RATE: 15 BRPM | SYSTOLIC BLOOD PRESSURE: 127 MMHG | OXYGEN SATURATION: 100 % | TEMPERATURE: 99 F | BODY MASS INDEX: 45.99 KG/M2 | WEIGHT: 293 LBS

## 2019-12-09 DIAGNOSIS — R06.02 SHORTNESS OF BREATH: ICD-10-CM

## 2019-12-09 DIAGNOSIS — R79.89 POSITIVE D DIMER: ICD-10-CM

## 2019-12-09 DIAGNOSIS — R07.9 CHEST PAIN, UNSPECIFIED TYPE: Primary | ICD-10-CM

## 2019-12-09 DIAGNOSIS — M71.21 BAKER CYST, RIGHT: ICD-10-CM

## 2019-12-09 DIAGNOSIS — M25.512 LEFT SHOULDER PAIN, UNSPECIFIED CHRONICITY: ICD-10-CM

## 2019-12-09 LAB
ALBUMIN SERPL BCP-MCNC: 3.1 G/DL (ref 3.5–5.2)
ALP SERPL-CCNC: 74 U/L (ref 55–135)
ALT SERPL W/O P-5'-P-CCNC: 22 U/L (ref 10–44)
ANION GAP SERPL CALC-SCNC: 9 MMOL/L (ref 8–16)
AST SERPL-CCNC: 29 U/L (ref 10–40)
B-HCG UR QL: NEGATIVE
BACTERIA #/AREA URNS HPF: ABNORMAL /HPF
BASOPHILS # BLD AUTO: 0.07 K/UL (ref 0–0.2)
BASOPHILS NFR BLD: 0.7 % (ref 0–1.9)
BILIRUB SERPL-MCNC: 0.4 MG/DL (ref 0.1–1)
BILIRUB UR QL STRIP: NEGATIVE
BNP SERPL-MCNC: 11 PG/ML (ref 0–99)
BUN SERPL-MCNC: 12 MG/DL (ref 6–20)
CALCIUM SERPL-MCNC: 9 MG/DL (ref 8.7–10.5)
CHLORIDE SERPL-SCNC: 105 MMOL/L (ref 95–110)
CLARITY UR: CLEAR
CO2 SERPL-SCNC: 25 MMOL/L (ref 23–29)
COLOR UR: ABNORMAL
CREAT SERPL-MCNC: 0.7 MG/DL (ref 0.5–1.4)
CTP QC/QA: YES
D DIMER PPP IA.FEU-MCNC: 10.86 MG/L FEU
DIFFERENTIAL METHOD: ABNORMAL
EOSINOPHIL # BLD AUTO: 0.1 K/UL (ref 0–0.5)
EOSINOPHIL NFR BLD: 0.9 % (ref 0–8)
ERYTHROCYTE [DISTWIDTH] IN BLOOD BY AUTOMATED COUNT: 14.4 % (ref 11.5–14.5)
EST. GFR  (AFRICAN AMERICAN): >60 ML/MIN/1.73 M^2
EST. GFR  (NON AFRICAN AMERICAN): >60 ML/MIN/1.73 M^2
GLUCOSE SERPL-MCNC: 101 MG/DL (ref 70–110)
GLUCOSE UR QL STRIP: NEGATIVE
HCT VFR BLD AUTO: 42.3 % (ref 37–48.5)
HGB BLD-MCNC: 12.6 G/DL (ref 12–16)
HGB UR QL STRIP: NEGATIVE
HYALINE CASTS #/AREA URNS LPF: 0 /LPF
IMM GRANULOCYTES # BLD AUTO: 0.04 K/UL (ref 0–0.04)
IMM GRANULOCYTES NFR BLD AUTO: 0.4 % (ref 0–0.5)
KETONES UR QL STRIP: NEGATIVE
LEUKOCYTE ESTERASE UR QL STRIP: ABNORMAL
LYMPHOCYTES # BLD AUTO: 5.8 K/UL (ref 1–4.8)
LYMPHOCYTES NFR BLD: 58.1 % (ref 18–48)
MCH RBC QN AUTO: 26.1 PG (ref 27–31)
MCHC RBC AUTO-ENTMCNC: 29.8 G/DL (ref 32–36)
MCV RBC AUTO: 88 FL (ref 82–98)
MICROSCOPIC COMMENT: ABNORMAL
MONOCYTES # BLD AUTO: 0.8 K/UL (ref 0.3–1)
MONOCYTES NFR BLD: 8.2 % (ref 4–15)
NEUTROPHILS # BLD AUTO: 3.2 K/UL (ref 1.8–7.7)
NEUTROPHILS NFR BLD: 31.7 % (ref 38–73)
NITRITE UR QL STRIP: NEGATIVE
NRBC BLD-RTO: 0 /100 WBC
PH UR STRIP: 5 [PH] (ref 5–8)
PLATELET # BLD AUTO: 262 K/UL (ref 150–350)
PMV BLD AUTO: 9.4 FL (ref 9.2–12.9)
POTASSIUM SERPL-SCNC: 3.8 MMOL/L (ref 3.5–5.1)
PROT SERPL-MCNC: 7.1 G/DL (ref 6–8.4)
PROT UR QL STRIP: ABNORMAL
RBC # BLD AUTO: 4.82 M/UL (ref 4–5.4)
RBC #/AREA URNS HPF: 2 /HPF (ref 0–4)
SODIUM SERPL-SCNC: 139 MMOL/L (ref 136–145)
SP GR UR STRIP: 1.03 (ref 1–1.03)
SQUAMOUS #/AREA URNS HPF: 15 /HPF
TROPONIN I SERPL DL<=0.01 NG/ML-MCNC: <0.006 NG/ML (ref 0–0.03)
URN SPEC COLLECT METH UR: ABNORMAL
UROBILINOGEN UR STRIP-ACNC: ABNORMAL EU/DL
WBC # BLD AUTO: 9.97 K/UL (ref 3.9–12.7)
WBC #/AREA URNS HPF: 5 /HPF (ref 0–5)

## 2019-12-09 PROCEDURE — 85025 COMPLETE CBC W/AUTO DIFF WBC: CPT

## 2019-12-09 PROCEDURE — 83880 ASSAY OF NATRIURETIC PEPTIDE: CPT

## 2019-12-09 PROCEDURE — 63600175 PHARM REV CODE 636 W HCPCS: Performed by: EMERGENCY MEDICINE

## 2019-12-09 PROCEDURE — 81025 URINE PREGNANCY TEST: CPT | Performed by: EMERGENCY MEDICINE

## 2019-12-09 PROCEDURE — 84484 ASSAY OF TROPONIN QUANT: CPT

## 2019-12-09 PROCEDURE — 93010 ELECTROCARDIOGRAM REPORT: CPT | Mod: ,,, | Performed by: INTERNAL MEDICINE

## 2019-12-09 PROCEDURE — 93010 EKG 12-LEAD: ICD-10-PCS | Mod: ,,, | Performed by: INTERNAL MEDICINE

## 2019-12-09 PROCEDURE — 99285 EMERGENCY DEPT VISIT HI MDM: CPT | Mod: 25

## 2019-12-09 PROCEDURE — 25500020 PHARM REV CODE 255: Performed by: EMERGENCY MEDICINE

## 2019-12-09 PROCEDURE — 80053 COMPREHEN METABOLIC PANEL: CPT

## 2019-12-09 PROCEDURE — 96374 THER/PROPH/DIAG INJ IV PUSH: CPT | Mod: 59

## 2019-12-09 PROCEDURE — 85379 FIBRIN DEGRADATION QUANT: CPT

## 2019-12-09 PROCEDURE — 81000 URINALYSIS NONAUTO W/SCOPE: CPT

## 2019-12-09 PROCEDURE — 93005 ELECTROCARDIOGRAM TRACING: CPT

## 2019-12-09 RX ORDER — KETOROLAC TROMETHAMINE 30 MG/ML
15 INJECTION, SOLUTION INTRAMUSCULAR; INTRAVENOUS
Status: COMPLETED | OUTPATIENT
Start: 2019-12-09 | End: 2019-12-09

## 2019-12-09 RX ORDER — CYCLOBENZAPRINE HCL 10 MG
10 TABLET ORAL 3 TIMES DAILY PRN
Qty: 15 TABLET | Refills: 0 | Status: SHIPPED | OUTPATIENT
Start: 2019-12-09 | End: 2019-12-14

## 2019-12-09 RX ORDER — KETOROLAC TROMETHAMINE 10 MG/1
10 TABLET, FILM COATED ORAL EVERY 6 HOURS PRN
Qty: 20 TABLET | Refills: 0 | Status: SHIPPED | OUTPATIENT
Start: 2019-12-09

## 2019-12-09 RX ADMIN — IOHEXOL 100 ML: 350 INJECTION, SOLUTION INTRAVENOUS at 05:12

## 2019-12-09 RX ADMIN — KETOROLAC TROMETHAMINE 15 MG: 30 INJECTION, SOLUTION INTRAMUSCULAR; INTRAVENOUS at 03:12

## 2019-12-09 NOTE — ED TRIAGE NOTES
44 y.o. Female presents to the ED with chief complaint of left side pain. Pt reports L shoulder pain starting this morning with associated SOB. Pt denies CP. SOB upon exertion. Pt resting in bed in NAD. Side rails up x2. AAOX4.

## 2019-12-09 NOTE — ED PROVIDER NOTES
"Encounter Date: 12/9/2019       History     Chief Complaint   Patient presents with    Shoulder Pain     C/o L shoulder pain radiating down L side     Shortness of Breath     "And I keep getting short of breath."     CC: Shoulder pain    HPI: This is a 44 y.o. F who has Arthritis who presents to the ED for emergent evaluation of acute and mild atraumatic pain to the left shoulder that began today. Pt also reports a 2 day history of atraumatic left side pain, a 2 day history of pain behind the right leg, and a 1 day history of SOB. There are no exacerbating or alleviating factors. She has been taking Tylenol for the pain without relief. Pt reports onset of intermittent cough 1 week after having the the flu last month. She has no known drug allergies. Pt denies abdominal pain, nausea, vomiting, dysuria, difficulty urinating, rash, or leg swelling. She does note bilateral foot swelling that she states is normal for her and unchanged. 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     The history is provided by the patient. No  was used.     Review of patient's allergies indicates:   Allergen Reactions    [place]      Past Medical History:   Diagnosis Date    Arthritis      History reviewed. No pertinent surgical history.  History reviewed. No pertinent family history.  Social History     Tobacco Use    Smoking status: Former Smoker     Types: Cigarettes    Smokeless tobacco: Never Used   Substance Use Topics    Alcohol use: No    Drug use: No     Review of Systems   Constitutional: Negative for chills and fever.   HENT: Negative for congestion and sore throat.    Eyes: Negative for visual disturbance.   Respiratory: Positive for shortness of breath. Negative for cough.    Cardiovascular: Negative for chest pain and leg swelling.   Gastrointestinal: Negative for abdominal pain, nausea and vomiting.   Genitourinary: Negative for difficulty urinating, dysuria and vaginal discharge. "   Musculoskeletal: Positive for arthralgias (left shoulder) and myalgias (behind the right leg).        (+) Left side pain  (+) Chronic bilateral foot swelling   Skin: Negative for rash.   Allergic/Immunologic: Negative for immunocompromised state.   Neurological: Negative for headaches.       Physical Exam     Initial Vitals [12/09/19 1438]   BP Pulse Resp Temp SpO2   129/76 100 20 99.2 °F (37.3 °C) 100 %      MAP       --         Physical Exam    Constitutional: She appears well-developed and well-nourished. She is not diaphoretic. No distress.   Morbidly obese   HENT:   Mouth/Throat: Oropharynx is clear and moist.   Eyes: Pupils are equal, round, and reactive to light.   Neck: Neck supple.   Cardiovascular: Normal rate and regular rhythm.   Pulmonary/Chest: Breath sounds normal. No respiratory distress. She has no wheezes. She has no rhonchi.         She exhibits tenderness.   Tenderness with palpation along left shoulder to left flank and left anterior inferior chest wall. No rash noted in this area on inspection of skin   Abdominal: Soft. There is no tenderness.   Musculoskeletal: She exhibits no edema.        Arms:  Tenderness to palpation behind right knee   Neurological: She is alert and oriented to person, place, and time.   Skin: Skin is warm and dry.   Psychiatric: She has a normal mood and affect.         ED Course   Procedures  Labs Reviewed   CBC W/ AUTO DIFFERENTIAL - Abnormal; Notable for the following components:       Result Value    Mean Corpuscular Hemoglobin 26.1 (*)     Mean Corpuscular Hemoglobin Conc 29.8 (*)     Lymph # 5.8 (*)     Gran% 31.7 (*)     Lymph% 58.1 (*)     All other components within normal limits   COMPREHENSIVE METABOLIC PANEL - Abnormal; Notable for the following components:    Albumin 3.1 (*)     All other components within normal limits   D DIMER, QUANTITATIVE - Abnormal; Notable for the following components:    D-Dimer 10.86 (*)     All other components within normal  limits   URINALYSIS, REFLEX TO URINE CULTURE - Abnormal; Notable for the following components:    Protein, UA 1+ (*)     Urobilinogen, UA 4.0-6.0 (*)     Leukocytes, UA Trace (*)     All other components within normal limits    Narrative:     Preferred Collection Type->Urine, Clean Catch   URINALYSIS MICROSCOPIC - Abnormal; Notable for the following components:    Bacteria Few (*)     All other components within normal limits    Narrative:     Preferred Collection Type->Urine, Clean Catch   B-TYPE NATRIURETIC PEPTIDE   TROPONIN I   POCT URINE PREGNANCY        ECG Results          EKG 12-lead (Preliminary result)  Result time 12/09/19 17:01:17    ED Interpretation by Yohana Seth MD (12/09/19 17:01:17)    Normal sinus rhythm, rate 95 beats per minute, normal ID interval, .  No STEMI, no malignant dysrhythmia.                            Imaging Results          US Lower Extremity Veins Bilateral (Final result)  Result time 12/09/19 19:17:17    Final result by Wilmer Andres MD (12/09/19 19:17:17)                 Impression:      No evidence of deep venous thrombosis in either lower extremity.    Right popliteal fossa probable Baker's cyst.      Electronically signed by: Wilmer Andres MD  Date:    12/09/2019  Time:    19:17             Narrative:    EXAMINATION:  US LOWER EXTREMITY VEINS BILATERAL    CLINICAL HISTORY:  Other specified abnormal findings of blood chemistry    TECHNIQUE:  Duplex and color flow Doppler and dynamic compression was performed of the bilateral lower extremity veins was performed.    COMPARISON:  Bilateral lower extremity venous upper ultrasound 07/24/2019    FINDINGS:  Right thigh veins: The common femoral, femoral, popliteal, upper greater saphenous, and deep femoral veins are patent and free of thrombus. The veins are normally compressible and have normal phasic flow and augmentation response.    Right calf veins: The visualized calf veins are patent.    Left thigh veins: The  common femoral, femoral, popliteal, upper greater saphenous, and deep femoral veins are patent and free of thrombus. The veins are normally compressible and have normal phasic flow and augmentation response.    Left calf veins: The visualized calf veins are patent.    Miscellaneous: At the right popliteal fossa there is a 3.9 x 1.5 x 2.3 cm nonvascular hypoechoic structure suggestive of a Baker's cyst                               CTA Chest Non-Coronary (PE Study) (Final result)  Result time 12/09/19 17:28:50    Final result by Janeth Moore MD (12/09/19 17:28:50)                 Impression:      1. No acute intrathoracic abnormalities identified.  2. Evaluation for potential pulmonary embolus is essentially nondiagnostic secondary to poor opacification of the pulmonary arteries.  No evidence of large central PE.  No secondary signs of PE seen.      Electronically signed by: Janeth Moore MD  Date:    12/09/2019  Time:    17:28             Narrative:    EXAMINATION:  CTA CHEST NON CORONARY    CLINICAL HISTORY:  Chest pain, acute, PE suspected, intermed prob, positive D-dimer;    TECHNIQUE:  Low dose axial images, sagittal and coronal reformations were obtained from the thoracic inlet to the lung bases following the IV administration of 100 mL of Omnipaque 350.  Contrast timing was optimized to evaluate the pulmonary arteries.  MIP images were performed.    COMPARISON:  None    FINDINGS:  Structures at the base of the neck are unremarkable.  Aorta is non-aneurysmal.  The heart is normal in size without pericardial effusion.  Pulmonary arterial tree is poorly opacified.  No evidence of central pulmonary embolus.  There is no evidence of mediastinal, axillary, or hilar lymph node enlargement.  The esophagus is unremarkable along its course.    The trachea and bronchi are patent.  The lungs are symmetrically expanded.  Lungs are clear with no evidence of mass, consolidation, or pleural effusion.  No evidence of  pulmonary hemorrhage or infarction.    Nonspecific region of hypoattenuation is seen the spleen posteriorly.  The visualized abdominal structures are otherwise unremarkable.  No acute osseous identified.  Degenerative changes are seen in the spine.  Extrathoracic soft tissues are unremarkable.                               X-Ray Chest AP Portable (Final result)  Result time 12/09/19 15:56:19    Final result by Jf Hollingsworth MD (12/09/19 15:56:19)                 Impression:      1. No acute cardiopulmonary process appreciated.      Electronically signed by: Jf Hollingsworth  Date:    12/09/2019  Time:    15:56             Narrative:    EXAMINATION:  XR CHEST AP PORTABLE    CLINICAL HISTORY:  shortness of breath;    TECHNIQUE:  AP view of the chest.  Images significantly degraded secondary to marked beam attenuation related to patient body habitus, limiting evaluation.    COMPARISON:  None    FINDINGS:  Cardiomediastinal silhouette is within normal limits.    No focal consolidation, overt interstitial edema, sizable pleural effusion or pneumothorax.    Multilevel degenerative changes of the imaged spine.                                 Medical Decision Making:   Initial Assessment:   44-year-old female presenting with left sided flank and chest pain, shortness of breath, right leg pain. On exam, she has tenderness with palpation along the left inferior shoulder, left flank and left anterior inferior chest wall without rash noted. She also shortness of breath.  There is no wheezes or rales.  There is no respiratory distress on exam.  Differential includes musculoskeletal pain, new onset CHF, URI, pneumonia, PE.  Workup initiated with labs, cardiac markers, D-dimer, chest x-ray and EKG.  ED Management:  CT a nondiagnostic, negative for large central PE.  Ultrasound of lower extremity shows right-sided Baker cyst, no DVT.  Troponin and BNP are negative, chest x-ray without focal finding.  Given tenderness on exam to  left-sided chest wall and left shoulder area, my overall impression of this patient is musculoskeletal pain. I will treat her with Toradol and muscle relaxant and have her follow up with her primary care physician.  She states she is scheduled to see her primary care doctor tomorrow and has a mammogram scheduled.               I, Yohana Seth MD, personally performed the services described in this documentation. All medical record entries made by the scribe were at my direction and in my presence. I have reviewed the chart and agree that the record reflects my personal performance and is accurate and complete.     ED Course as of Dec 09 2213   Mon Dec 09, 2019   1656 D-dimer positive, CT a ordered.  Patient updated regarding CTA. Patient reports pain improved.    [LH]      ED Course User Index  [LH] Yohana Seth MD                Clinical Impression:       ICD-10-CM ICD-9-CM   1. Chest pain, unspecified type R07.9 786.50   2. Shortness of breath R06.02 786.05   3. Positive D dimer R79.89 790.92   4. Left shoulder pain, unspecified chronicity M25.512 719.41   5. Castro cyst, right M71.21 727.51                             Yohana Seth MD  12/09/19 3551

## 2020-04-24 ENCOUNTER — HOSPITAL ENCOUNTER (EMERGENCY)
Facility: HOSPITAL | Age: 45
Discharge: HOME | End: 2020-04-24
Attending: EMERGENCY MEDICINE
Payer: MEDICAID

## 2020-04-24 VITALS
OXYGEN SATURATION: 100 % | SYSTOLIC BLOOD PRESSURE: 141 MMHG | RESPIRATION RATE: 23 BRPM | DIASTOLIC BLOOD PRESSURE: 77 MMHG | BODY MASS INDEX: 45.99 KG/M2 | HEART RATE: 84 BPM | TEMPERATURE: 99 F | HEIGHT: 67 IN | WEIGHT: 293 LBS

## 2020-04-24 DIAGNOSIS — R56.9 SEIZURE-LIKE ACTIVITY: Primary | ICD-10-CM

## 2020-04-24 DIAGNOSIS — R00.0 TACHYCARDIA: ICD-10-CM

## 2020-04-24 DIAGNOSIS — R56.9 SEIZURE: ICD-10-CM

## 2020-04-24 DIAGNOSIS — E86.0 DEHYDRATION: ICD-10-CM

## 2020-04-24 LAB
ALBUMIN SERPL BCP-MCNC: 3.5 G/DL (ref 3.5–5.2)
ALP SERPL-CCNC: 55 U/L (ref 55–135)
ALT SERPL W/O P-5'-P-CCNC: 15 U/L (ref 10–44)
AMPHET+METHAMPHET UR QL: NEGATIVE
ANION GAP SERPL CALC-SCNC: 10 MMOL/L (ref 8–16)
AST SERPL-CCNC: 20 U/L (ref 10–40)
B-HCG UR QL: NEGATIVE
BACTERIA #/AREA URNS HPF: NORMAL /HPF
BARBITURATES UR QL SCN>200 NG/ML: NEGATIVE
BASOPHILS # BLD AUTO: 0.02 K/UL (ref 0–0.2)
BASOPHILS NFR BLD: 0.2 % (ref 0–1.9)
BENZODIAZ UR QL SCN>200 NG/ML: NEGATIVE
BILIRUB SERPL-MCNC: 0.3 MG/DL (ref 0.1–1)
BILIRUB UR QL STRIP: NEGATIVE
BUN SERPL-MCNC: 13 MG/DL (ref 6–20)
BZE UR QL SCN: NEGATIVE
CALCIUM SERPL-MCNC: 8.8 MG/DL (ref 8.7–10.5)
CANNABINOIDS UR QL SCN: NORMAL
CHLORIDE SERPL-SCNC: 105 MMOL/L (ref 95–110)
CLARITY UR: CLEAR
CO2 SERPL-SCNC: 25 MMOL/L (ref 23–29)
COLOR UR: YELLOW
CREAT SERPL-MCNC: 0.9 MG/DL (ref 0.5–1.4)
CREAT UR-MCNC: 182.6 MG/DL (ref 15–325)
CTP QC/QA: YES
CTP QC/QA: YES
DIFFERENTIAL METHOD: ABNORMAL
EOSINOPHIL # BLD AUTO: 0.1 K/UL (ref 0–0.5)
EOSINOPHIL NFR BLD: 0.9 % (ref 0–8)
ERYTHROCYTE [DISTWIDTH] IN BLOOD BY AUTOMATED COUNT: 13.4 % (ref 11.5–14.5)
EST. GFR  (AFRICAN AMERICAN): >60 ML/MIN/1.73 M^2
EST. GFR  (NON AFRICAN AMERICAN): >60 ML/MIN/1.73 M^2
GLUCOSE SERPL-MCNC: 142 MG/DL (ref 70–110)
GLUCOSE UR QL STRIP: NEGATIVE
HCT VFR BLD AUTO: 40.9 % (ref 37–48.5)
HGB BLD-MCNC: 12.5 G/DL (ref 12–16)
HGB UR QL STRIP: NEGATIVE
HYALINE CASTS #/AREA URNS LPF: 0 /LPF
IMM GRANULOCYTES # BLD AUTO: 0.03 K/UL (ref 0–0.04)
IMM GRANULOCYTES NFR BLD AUTO: 0.3 % (ref 0–0.5)
KETONES UR QL STRIP: NEGATIVE
LEUKOCYTE ESTERASE UR QL STRIP: ABNORMAL
LYMPHOCYTES # BLD AUTO: 4.6 K/UL (ref 1–4.8)
LYMPHOCYTES NFR BLD: 43.7 % (ref 18–48)
MCH RBC QN AUTO: 26.5 PG (ref 27–31)
MCHC RBC AUTO-ENTMCNC: 30.6 G/DL (ref 32–36)
MCV RBC AUTO: 87 FL (ref 82–98)
METHADONE UR QL SCN>300 NG/ML: NEGATIVE
MICROSCOPIC COMMENT: NORMAL
MONOCYTES # BLD AUTO: 0.7 K/UL (ref 0.3–1)
MONOCYTES NFR BLD: 6.9 % (ref 4–15)
NEUTROPHILS # BLD AUTO: 5 K/UL (ref 1.8–7.7)
NEUTROPHILS NFR BLD: 48 % (ref 38–73)
NITRITE UR QL STRIP: NEGATIVE
NRBC BLD-RTO: 0 /100 WBC
OPIATES UR QL SCN: NEGATIVE
PCP UR QL SCN>25 NG/ML: NEGATIVE
PH UR STRIP: 6 [PH] (ref 5–8)
PLATELET # BLD AUTO: 305 K/UL (ref 150–350)
PMV BLD AUTO: 10 FL (ref 9.2–12.9)
POC MOLECULAR INFLUENZA A AGN: NEGATIVE
POC MOLECULAR INFLUENZA B AGN: NEGATIVE
POTASSIUM SERPL-SCNC: 3.7 MMOL/L (ref 3.5–5.1)
PROT SERPL-MCNC: 7.4 G/DL (ref 6–8.4)
PROT UR QL STRIP: ABNORMAL
RBC # BLD AUTO: 4.72 M/UL (ref 4–5.4)
RBC #/AREA URNS HPF: 0 /HPF (ref 0–4)
SODIUM SERPL-SCNC: 140 MMOL/L (ref 136–145)
SP GR UR STRIP: 1.02 (ref 1–1.03)
TOXICOLOGY INFORMATION: NORMAL
TSH SERPL DL<=0.005 MIU/L-ACNC: 0.55 UIU/ML (ref 0.4–4)
URN SPEC COLLECT METH UR: ABNORMAL
UROBILINOGEN UR STRIP-ACNC: ABNORMAL EU/DL
WBC # BLD AUTO: 10.48 K/UL (ref 3.9–12.7)
WBC #/AREA URNS HPF: 2 /HPF (ref 0–5)

## 2020-04-24 PROCEDURE — 81000 URINALYSIS NONAUTO W/SCOPE: CPT | Mod: 59

## 2020-04-24 PROCEDURE — 80053 COMPREHEN METABOLIC PANEL: CPT

## 2020-04-24 PROCEDURE — 82962 GLUCOSE BLOOD TEST: CPT

## 2020-04-24 PROCEDURE — 80307 DRUG TEST PRSMV CHEM ANLYZR: CPT

## 2020-04-24 PROCEDURE — 25000003 PHARM REV CODE 250: Performed by: EMERGENCY MEDICINE

## 2020-04-24 PROCEDURE — 96361 HYDRATE IV INFUSION ADD-ON: CPT

## 2020-04-24 PROCEDURE — 99285 EMERGENCY DEPT VISIT HI MDM: CPT | Mod: 25

## 2020-04-24 PROCEDURE — 96360 HYDRATION IV INFUSION INIT: CPT

## 2020-04-24 PROCEDURE — 85025 COMPLETE CBC W/AUTO DIFF WBC: CPT

## 2020-04-24 PROCEDURE — 93005 ELECTROCARDIOGRAM TRACING: CPT

## 2020-04-24 PROCEDURE — 87502 INFLUENZA DNA AMP PROBE: CPT

## 2020-04-24 PROCEDURE — 81025 URINE PREGNANCY TEST: CPT | Performed by: EMERGENCY MEDICINE

## 2020-04-24 PROCEDURE — 84443 ASSAY THYROID STIM HORMONE: CPT

## 2020-04-24 RX ORDER — MAG HYDROX/ALUMINUM HYD/SIMETH 200-200-20
30 SUSPENSION, ORAL (FINAL DOSE FORM) ORAL
Status: COMPLETED | OUTPATIENT
Start: 2020-04-24 | End: 2020-04-24

## 2020-04-24 RX ORDER — IBUPROFEN 200 MG
200 TABLET ORAL EVERY 6 HOURS PRN
COMMUNITY

## 2020-04-24 RX ADMIN — ALUMINUM HYDROXIDE, MAGNESIUM HYDROXIDE, AND SIMETHICONE 30 ML: 200; 200; 20 SUSPENSION ORAL at 08:04

## 2020-04-24 RX ADMIN — SODIUM CHLORIDE 1000 ML: 0.9 INJECTION, SOLUTION INTRAVENOUS at 04:04

## 2020-04-24 NOTE — DISCHARGE INSTRUCTIONS
You were seen in the emergency department after a seizure.  We observed you and you returned to baseline.  Your head CT was normal.  Your labs are reassuring. We think you're safe to go home.  Do not take tramadol as it can cause seizures.  Do not drink, drive, operate heavy machinery, swim, watch children unattended, works at heights until you are seen by a neurologist and cleared to return to these activities. Please return for any new seizures, numbness, weakness, severe headache, or other new or worsening concerns.

## 2020-04-24 NOTE — ED NOTES
Patient to ed via ems from work for c/o possible seizure. Pt has hx of seizures. States last seizure was approx 10 years ago. Patient denies taking meds. Patient is AAOx3. Neuro intact. Pt follows commands. Pt moves bilat upper and lower ext equally. Pupils are equal and reactive.  Pt denies cough or SOB. Pt denies pain. Patient has no other c/o. Monitor in place. MD at bedside for al

## 2020-04-24 NOTE — ED PROVIDER NOTES
"Encounter Date: 4/24/2020       History     Chief Complaint   Patient presents with    Seizures     Pt to ER per EMS for possible seizure like activity witnessed by friends. reports pt started making " sounds" and shaking. EMS reports pt making a "strange sounds" in rout then "snapped out of it and does not recal the event. Pt with hx of childhood seizures      HPI   44-year-old female with a history of seizures and hypertension presents the ED with possible seizure-like symptoms.  She states that she was waiting in the car for a friend to come out of where she works so they can talk as they do most afternoons.  While waiting their had this sensation of warmth throughout her body.  She denies any chest pain, nausea, vomiting, tingling or weakness anywhere.  States she has felt like this before when she has had a seizure.  Patient states that there was no loss of consciousness.  She remembers the entire event.  She did not fall or hit her head.  She did not urinate or defecate on herself.  There was no tongue biting.      Per EMS patient was making strange noises in route.  She did not defecate or urinate on herself.  Did not have any convulsive type episodes.  Review of patient's allergies indicates:   Allergen Reactions    [place]      Past Medical History:   Diagnosis Date    Arthritis     Seizure      History reviewed. No pertinent surgical history.  No family history on file.  Social History     Tobacco Use    Smoking status: Former Smoker     Types: Cigarettes    Smokeless tobacco: Never Used   Substance Use Topics    Alcohol use: No    Drug use: No     Review of Systems   Constitutional: Negative for fever.   HENT: Negative for sore throat.    Respiratory: Negative for shortness of breath.    Cardiovascular: Negative for chest pain.   Gastrointestinal: Negative for nausea.   Genitourinary: Negative for dysuria.   Musculoskeletal: Negative for back pain.   Skin: Negative for rash.   Neurological: " Positive for seizures. Negative for weakness.   Hematological: Does not bruise/bleed easily.       Physical Exam     Initial Vitals [04/24/20 1623]   BP Pulse Resp Temp SpO2   132/75 (!) 120 20 99.4 °F (37.4 °C) 97 %      MAP       --         Physical Exam    Constitutional: She appears well-developed and well-nourished.   HENT:   Head: Normocephalic and atraumatic.   Mouth/Throat: Oropharynx is clear and moist.   Eyes: Conjunctivae are normal. No scleral icterus.   Neck: Normal range of motion. Neck supple.   Cardiovascular: Normal heart sounds and intact distal pulses.   Pulmonary/Chest: Breath sounds normal. No respiratory distress.   Abdominal: Soft. She exhibits no distension. There is no tenderness.   Musculoskeletal: Normal range of motion. She exhibits no edema or tenderness.   Neurological: She is alert and oriented to person, place, and time. She has normal strength. No sensory deficit. GCS score is 15. GCS eye subscore is 4. GCS verbal subscore is 5. GCS motor subscore is 6.   Skin: Skin is warm and dry.   Psychiatric: She has a normal mood and affect.         ED Course   Procedures  Labs Reviewed - No data to display       Imaging Results    None         44-year-old female with history of seizures as a child presents the ED with with a heat wave episode/seizure-like activity witnessed by friends.  Patient never lost consciousness for her.  A differential includes seizures, pseudoseizures, electrolyte imbalance, dehydration, infection.  I have low suspicion for ACS with no chest pain.  I have low suspicion for stroke with no weakness or other neuro deficits and a normal neuro exam.  Labs and head CT acquired.  All overall reassuring.  Patient's heart rate did respond to fluids.  I do believe she was dehydrated.  I have discussed all of this with the patient.  She will follow up with a neurologist and if it happens again she will come back to the ED for further evaluation.    Awaiting UA and urine  pregnancy.    Patient handed off to Dr. Dowd for follow-up with her urine                                     Clinical Impression:       ICD-10-CM ICD-9-CM   1. Seizure-like activity R56.9 780.39   2. Seizure R56.9 780.39   3. Tachycardia R00.0 785.0   4. Dehydration E86.0 276.51                                Debora Ge MD  04/24/20 2047

## 2020-04-25 NOTE — ED PROVIDER NOTES
Dameon Dowd  Urinalysis, U preg, and tox screen unremarkable.  Patient does not want antiepileptics.  No further seizure activity.  Well-appearing, normal vitals, has no complaints at this time.  Believe the patient is safe for discharge home.  Given return precautions, driving precautions, and need to follow-up with neurology.     Dameon Dowd MD  04/25/20 8035

## 2020-10-15 NOTE — DISCHARGE INSTRUCTIONS
Please get plenty of rest and drink lots of water to stay well-hydrated.    Try to eat a low-fat diet.  See handout.    Take zofran for nausea/vomiting, as needed.    Follow up with a general surgeon for further evaluation of your symptoms, if they persist.    Return to the ER for any new/worsening symptoms (fever, intractable vomiting, worsening abdominal pain).      
H/O heart surgery

## 2022-07-16 ENCOUNTER — HOSPITAL ENCOUNTER (EMERGENCY)
Facility: HOSPITAL | Age: 47
Discharge: HOME | End: 2022-07-16
Attending: STUDENT IN AN ORGANIZED HEALTH CARE EDUCATION/TRAINING PROGRAM
Payer: MEDICAID

## 2022-07-16 VITALS
HEART RATE: 70 BPM | OXYGEN SATURATION: 98 % | RESPIRATION RATE: 17 BRPM | SYSTOLIC BLOOD PRESSURE: 131 MMHG | BODY MASS INDEX: 45.99 KG/M2 | TEMPERATURE: 99 F | DIASTOLIC BLOOD PRESSURE: 76 MMHG | HEIGHT: 67 IN | WEIGHT: 293 LBS

## 2022-07-16 DIAGNOSIS — M25.551 RIGHT HIP PAIN: Primary | ICD-10-CM

## 2022-07-16 LAB
B-HCG UR QL: NEGATIVE
BILIRUBIN, POC UA: NEGATIVE
BLOOD, POC UA: ABNORMAL
CLARITY, POC UA: CLEAR
COLOR, POC UA: YELLOW
CTP QC/QA: YES
GLUCOSE, POC UA: NEGATIVE
KETONES, POC UA: NEGATIVE
LEUKOCYTE EST, POC UA: NEGATIVE
NITRITE, POC UA: NEGATIVE
PH UR STRIP: 8.5 [PH]
PROTEIN, POC UA: NEGATIVE
SPECIFIC GRAVITY, POC UA: 1.02
UROBILINOGEN, POC UA: 1 E.U./DL

## 2022-07-16 PROCEDURE — 63600175 PHARM REV CODE 636 W HCPCS: Mod: ER | Performed by: STUDENT IN AN ORGANIZED HEALTH CARE EDUCATION/TRAINING PROGRAM

## 2022-07-16 PROCEDURE — 81003 URINALYSIS AUTO W/O SCOPE: CPT | Mod: ER

## 2022-07-16 PROCEDURE — 96372 THER/PROPH/DIAG INJ SC/IM: CPT | Performed by: STUDENT IN AN ORGANIZED HEALTH CARE EDUCATION/TRAINING PROGRAM

## 2022-07-16 PROCEDURE — 99284 EMERGENCY DEPT VISIT MOD MDM: CPT | Mod: 25,ER

## 2022-07-16 PROCEDURE — 81025 URINE PREGNANCY TEST: CPT | Mod: ER | Performed by: STUDENT IN AN ORGANIZED HEALTH CARE EDUCATION/TRAINING PROGRAM

## 2022-07-16 RX ORDER — KETOROLAC TROMETHAMINE 30 MG/ML
15 INJECTION, SOLUTION INTRAMUSCULAR; INTRAVENOUS
Status: COMPLETED | OUTPATIENT
Start: 2022-07-16 | End: 2022-07-16

## 2022-07-16 RX ADMIN — KETOROLAC TROMETHAMINE 15 MG: 30 INJECTION, SOLUTION INTRAMUSCULAR at 08:07

## 2022-07-16 NOTE — ED PROVIDER NOTES
Encounter Date: 7/16/2022    SCRIBE #1 NOTE: I, Vernell Guerrier, am scribing for, and in the presence of,  Prisca Marquez DO. I have scribed the following portions of the note - Other sections scribed: HPI, ROS, PE.       History     Chief Complaint   Patient presents with    Pelvic Pain     Patient presents to ED c/o pelvic pain, pt reports Right side pelvic pain onset 0200, states it radiates to lower back to right leg.   Denies bowel and bladder changes, n/v/d, chills,fever     46 y.o. female with Hx of Arthritis who presents to the ED with chief complaint of sharp, stabbing right hip pain radiating down her right leg onset 2 AM. Pain is rated 10/10. Patient also endorses tingling to the right leg, worse at night, for 2 days. She endorses prior history of hip pain in the area 5-6 years ago and history of Arthritis in her bilateral knees. Denies fever, chills, chest pain, shortness of breath, vaginal or pelvic pain, vaginal discharge, vaginal odor, numbness, rash, nausea, or vomiting. She denies injury to the area and is ambulatory with pain. Patient took a muscle relaxer at 2 AM with no alleviation of symptoms. No exacerbating factors. Patient denies smoking, EtOH consumption, or recreational drug use. LMP: in June. NKDA.    The history is provided by the patient. No  was used.     Review of patient's allergies indicates:   Allergen Reactions    [place]      Past Medical History:   Diagnosis Date    Arthritis     Seizure      No past surgical history on file.  No family history on file.  Social History     Tobacco Use    Smoking status: Former Smoker     Types: Cigarettes    Smokeless tobacco: Never Used   Substance Use Topics    Alcohol use: No    Drug use: No     Review of Systems   Constitutional: Negative for chills and fever.   HENT: Negative for drooling, facial swelling and trouble swallowing.    Eyes: Negative for redness and visual disturbance.   Respiratory: Negative  for cough, shortness of breath and stridor.    Cardiovascular: Negative for chest pain.   Gastrointestinal: Negative for abdominal pain, nausea and vomiting.   Genitourinary: Negative for dysuria, hematuria, vaginal discharge and vaginal pain.        (-) Vaginal odor.   Musculoskeletal: Negative for gait problem, joint swelling and neck stiffness.   Skin: Negative for pallor, rash and wound.   Neurological: Negative for syncope, facial asymmetry, speech difficulty, weakness and numbness.   Psychiatric/Behavioral: Negative for confusion.   All other systems reviewed and are negative.      Physical Exam     Initial Vitals [07/16/22 0822]   BP Pulse Resp Temp SpO2   136/76 70 17 98.5 °F (36.9 °C) 98 %      MAP       --         Physical Exam    Nursing note and vitals reviewed.  Constitutional: Vital signs are normal. She appears well-developed and well-nourished. She is not diaphoretic. She is Obese .  Non-toxic appearance. She does not have a sickly appearance. She does not appear ill.   HENT:   Head: Normocephalic and atraumatic.   Mouth/Throat: Uvula is midline and mucous membranes are normal.   Eyes: Conjunctivae are normal. No scleral icterus.   Neck: Neck supple. No JVD present.   Normal range of motion.  Cardiovascular: Normal rate, regular rhythm, normal heart sounds and intact distal pulses.   Pulmonary/Chest: Breath sounds normal. No respiratory distress.   Abdominal: Abdomen is soft. She exhibits no distension. There is no abdominal tenderness. There is no rebound and no guarding.   Musculoskeletal:         General: Tenderness present. No edema. Normal range of motion.      Cervical back: Normal, normal range of motion and neck supple. No rigidity. No spinous process tenderness.      Thoracic back: Normal.      Lumbar back: Normal.      Right hip: Tenderness present. No deformity or crepitus. Normal range of motion. Normal strength.      Left hip: Normal.      Right upper leg: Normal.      Left upper leg:  Normal.      Right knee: Normal.      Left knee: Normal.      Comments: Tenderness to palpation of lateral aspect proximal right femur. Trace edema bilateral ankles.     Neurological: She is alert. She has normal strength. She displays no atrophy and no tremor. She exhibits normal muscle tone. She displays a negative Romberg sign. She displays no seizure activity. Gait abnormal.   Antalgic gait.   Skin: Skin is warm and dry. No rash noted. No erythema.         ED Course   Procedures  Labs Reviewed   POCT URINALYSIS W/O SCOPE - Abnormal; Notable for the following components:       Result Value    Blood, UA Trace-intact (*)     All other components within normal limits   POCT URINE PREGNANCY          Imaging Results          X-Ray Hip 2 or 3 views Right (with Pelvis when performed) (Final result)  Result time 07/16/22 09:46:26    Final result by Dalia Burgess MD (07/16/22 09:46:26)                 Impression:      Normal exam.      Electronically signed by: Dalia Burgess MD  Date:    07/16/2022  Time:    09:46             Narrative:    EXAMINATION:  XR HIP WITH PELVIS WHEN PERFORMED, 2 OR 3  VIEWS RIGHT    CLINICAL HISTORY:  hip pain;    TECHNIQUE:  AP view of the pelvis and frog leg lateral view of the right hip were performed.    COMPARISON:  None    FINDINGS:  The osseous structures, soft tissues and joint spaces are normal.                                 Medications   ketorolac injection 15 mg (15 mg Intramuscular Given 7/16/22 0852)     Medical Decision Making:   Clinical Tests:   Lab Tests: Ordered and Reviewed  Radiological Study: Ordered and Reviewed  ED Management:   [facility]  This is an emergent evaluation of a 46 y.o. Female with past medical history of arthritis who presents  nontraumatic with right leg pain. Initial vitals in the ED  unremarkable. Physical exam noted above. DDx includes but is not limited to arthritis vs  other musculoskeletal pain, UTI. Also considered but clinically less likely to  be fracture, dislocation, ovarian torsion, bowel obstruction, septic joint. Will obtain labs and imaging including  UA, UPT, right hip x-ray. Will also provide  IM Toradol for pain. Will continue to monitor and frequently reassess pending results of labs, treatments and final disposition. Will likely discharge with PCP follow-up and ED return precautions pending benign workup. Patient is aware of plan and is amenable.     Prisca Marquez D.O  EMERGENCY MEDICINE  8:59 AM 07/16/2022    UPDATE  Work-up benign. On reassessment, patient symptoms improved. Given benign exam and work-up, symptoms less likely to be due to a life threatening cause. Will discharge as stated above with instruction for symptomatic treatment at home. Patient is aware and agreeable to the plan.     Prisca Marquez D.O  EMERGENCY MEDICINE   10:40 AM 07/16/2022            Scribe Attestation:   Scribe #1: I performed the above scribed service and the documentation accurately describes the services I performed. I attest to the accuracy of the note.               I, Prisca Marquez, DO, personally performed the services described in this documentation.  All medical record entries made by the scribe were at my direction and in my presence.  I have reviewed the chart and agree that the record reflects my personal performance and is accurate and complete.  Clinical Impression:   Final diagnoses:  [M25.551] Right hip pain (Primary)          ED Disposition Condition    Discharge Stable        ED Prescriptions     None        Follow-up Information     Follow up With Specialties Details Why Contact Info    Jovany Adams Jr., MD Family Medicine Schedule an appointment as soon as possible for a visit in 3 days Emergency Room Follow-up [address] [facility] [address]  [phone]      [facility] ED Emergency Medicine Go to  If symptoms worsen [address] [facility] [address] [phone]           Prisca  RUBI Marquez,   07/16/22 6007

## 2023-12-18 ENCOUNTER — HOSPITAL ENCOUNTER (EMERGENCY)
Facility: HOSPITAL | Age: 48
Discharge: HOME | End: 2023-12-18
Attending: EMERGENCY MEDICINE
Payer: MEDICAID

## 2023-12-18 VITALS
OXYGEN SATURATION: 99 % | DIASTOLIC BLOOD PRESSURE: 89 MMHG | HEIGHT: 67 IN | BODY MASS INDEX: 45.99 KG/M2 | SYSTOLIC BLOOD PRESSURE: 151 MMHG | RESPIRATION RATE: 18 BRPM | TEMPERATURE: 98 F | HEART RATE: 95 BPM | WEIGHT: 293 LBS

## 2023-12-18 DIAGNOSIS — M79.641 RIGHT HAND PAIN: Primary | ICD-10-CM

## 2023-12-18 DIAGNOSIS — M25.439 WRIST SWELLING: ICD-10-CM

## 2023-12-18 DIAGNOSIS — M25.531 RIGHT WRIST PAIN: ICD-10-CM

## 2023-12-18 LAB
B-HCG UR QL: NEGATIVE
CTP QC/QA: YES

## 2023-12-18 PROCEDURE — 81025 URINE PREGNANCY TEST: CPT | Mod: ER

## 2023-12-18 PROCEDURE — 99284 EMERGENCY DEPT VISIT MOD MDM: CPT | Mod: 25,ER

## 2023-12-18 PROCEDURE — 81025 URINE PREGNANCY TEST: CPT | Mod: ER | Performed by: EMERGENCY MEDICINE

## 2023-12-18 RX ORDER — NAPROXEN 500 MG/1
500 TABLET ORAL 2 TIMES DAILY
Qty: 20 TABLET | Refills: 0 | Status: SHIPPED | OUTPATIENT
Start: 2023-12-18

## 2023-12-18 RX ORDER — HYDROCODONE BITARTRATE AND ACETAMINOPHEN 7.5; 325 MG/1; MG/1
1 TABLET ORAL EVERY 6 HOURS PRN
Qty: 11 TABLET | Refills: 0 | Status: SHIPPED | OUTPATIENT
Start: 2023-12-18

## 2023-12-18 NOTE — Clinical Note
"Hadley Arreguin" Rachael was seen and treated in our emergency department on 12/18/2023.  She may return to work on 12/19/2023.       If you have any questions or concerns, please don't hesitate to call.      Destiney Bowen PA-C"

## 2023-12-18 NOTE — ED PROVIDER NOTES
Encounter Date: 12/18/2023    SCRIBE #1 NOTE: ILynsey, am scribing for, and in the presence of,  Destiney Bowen PA-C. I have scribed the following portions of the note - Other sections scribed: HPI, ROS.       History     Chief Complaint   Patient presents with    Hand Pain     Complains of atraumatic pain and swelling to R hand and wrist x3 days.     48-year-old female with past medical history of arthritis, who presents to the ED for 4 day history of right hand pain. Patient states the pain radiates up to the right elbow and throbs at nighttime. Patient reports associated right hand edema and states she is having difficulty bending her fingers. Patient notes attempted treatment with Ibuprofen and Tylenol with no relief; last attempt with Tylenol this morning. No falls, injuries, or trauma to the area. 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 No known drug allergies.     The history is provided by the patient. No  was used.     Review of patient's allergies indicates:   Allergen Reactions    [place]      Past Medical History:   Diagnosis Date    Arthritis     Seizure      History reviewed. No pertinent surgical history.  History reviewed. No pertinent family history.  Social History     Tobacco Use    Smoking status: Former     Types: Cigarettes    Smokeless tobacco: Never   Substance Use Topics    Alcohol use: No    Drug use: No     Review of Systems   Constitutional:  Negative for chills and fever.   HENT:  Negative for rhinorrhea and sore throat.    Respiratory:  Negative for cough and shortness of breath.    Cardiovascular:  Negative for chest pain.   Gastrointestinal:  Negative for abdominal pain, constipation, diarrhea, nausea and vomiting.   Genitourinary:  Negative for dysuria.    Musculoskeletal:  Positive for arthralgias (generalized to right hand, radiating up to right elbow) and joint swelling (right hand).       Physical Exam     Initial Vitals [12/18/23 1600]   BP Pulse Resp Temp SpO2   (!) 154/86 98 18 98 °F (36.7 °C) 99 %      MAP       --         Physical Exam    Nursing note and vitals reviewed.  Constitutional: She appears well-developed and well-nourished.  Non-toxic appearance. She does not appear ill.   HENT:   Head: Normocephalic and atraumatic.   Right Ear: Hearing, tympanic membrane, external ear and ear canal normal. Tympanic membrane is not perforated, not erythematous and not bulging.   Left Ear: Hearing, tympanic membrane, external ear and ear canal normal. Tympanic membrane is not perforated, not erythematous and not bulging.   Nose: Nose normal.   Mouth/Throat: Uvula is midline, oropharynx is clear and moist and mucous membranes are normal.   Eyes: Conjunctivae and EOM are normal.   Neck: Neck supple.   Normal range of motion.   Full passive range of motion without pain.     Cardiovascular:  Normal rate and regular rhythm.           Pulses:       Radial pulses are 2+ on the right side and 2+ on the left side.   Pulmonary/Chest: Effort normal and breath sounds normal. No accessory muscle usage. No respiratory distress. She has no decreased breath sounds.   Abdominal: Abdomen is soft. Bowel sounds are normal. She exhibits no distension. There is no abdominal tenderness. There is no rebound and no guarding.   Musculoskeletal:         General: Normal range of motion.      Cervical back: Full passive range of motion without pain, normal range of motion and neck supple. No rigidity.      Comments: Decreased range of motion of right fingers and right wrist secondary to pain.  Full range of motion of left fingers, left wrist, bilateral elbows, bilateral shoulders.  Strength and sensation intact to bilateral upper extremities.  No evidence of any erythema, edema, bruising,  rash, or cellulitis patient's bilateral upper extremities.     Neurological: She is alert. No cranial nerve deficit.   Neuro intact.  Strength and sensation intact bilateral upper and lower extremities.   Skin: Skin is warm and dry.   Psychiatric: She has a normal mood and affect.         ED Course   Procedures  Labs Reviewed   POCT URINE PREGNANCY          Imaging Results              X-Ray Wrist Complete Right (Final result)  Result time 12/18/23 18:09:55      Final result by Wilmer Andres MD (12/18/23 18:09:55)                   Impression:      No acute displaced fracture-dislocation identified.      Electronically signed by: Wilmer Andres MD  Date:    12/18/2023  Time:    18:09               Narrative:    EXAMINATION:  XR HAND COMPLETE 3 VIEW RIGHT; XR WRIST COMPLETE 3 VIEWS RIGHT    CLINICAL HISTORY:  hand swelling; Effusion, unspecified wrist    TECHNIQUE:  PA, lateral, and oblique views of the right hand and right wrist were performed.    COMPARISON:  None    FINDINGS:  Bones are well mineralized. Overall alignment is within normal limits.  Carpus appears intact.  No displaced fracture, dislocation or destructive osseous process. Joint spaces appear relatively maintained. No subcutaneous emphysema or radiodense retained foreign body.                                       X-Ray Hand 3 view Right (Final result)  Result time 12/18/23 18:09:55      Final result by Wilmer Andres MD (12/18/23 18:09:55)                   Impression:      No acute displaced fracture-dislocation identified.      Electronically signed by: Wilmer Andres MD  Date:    12/18/2023  Time:    18:09               Narrative:    EXAMINATION:  XR HAND COMPLETE 3 VIEW RIGHT; XR WRIST COMPLETE 3 VIEWS RIGHT    CLINICAL HISTORY:  hand swelling; Effusion, unspecified wrist    TECHNIQUE:  PA, lateral, and oblique views of the right hand and right wrist were performed.    COMPARISON:  None    FINDINGS:  Bones are well mineralized. Overall  alignment is within normal limits.  Carpus appears intact.  No displaced fracture, dislocation or destructive osseous process. Joint spaces appear relatively maintained. No subcutaneous emphysema or radiodense retained foreign body.                                       Medications - No data to display  Medical Decision Making  This is a 48-year-old female with past medical history of arthritis, who presents to the ED for 4 day history of right hand pain. Patient states the pain radiates up to the right elbow and throbs at nighttime.  On physical exam, patient is well-appearing and in no acute distress.  Nontoxic appearing.  Lungs are clear to auscultation bilaterally.  Abdomen is soft and nontender.  No guarding, rigidity, rebound.  2+ radial pulses bilaterally.  Posterior oropharynx is not erythematous.  No edema or exudate.  Uvula midline.  Bilateral tympanic membrane is normal.  No erythema, bulging, or perforations.  Neuro intact.  Strength and sensation intact bilateral upper and lower extremities.  Decreased range of motion of right fingers and right wrist secondary to pain.  Full range of motion of left fingers, left wrist, bilateral elbows, bilateral shoulders.  Strength and sensation intact to bilateral upper extremities.  No evidence of any erythema, edema, bruising, rash, or cellulitis patient's bilateral upper extremities.  X-rays revealed no evidence of any acute displaced fractures or dislocations of the right hand or wrist.  Will discharge patient on anti-inflammatories and a short course of pain medicine.  Ace wrap applied.  Ambulatory referral to orthopedist ordered.  Urged prompt follow-up with orthopedist and PCP for further evaluation.  Other orthopedist information was also given and patient's discharge paperwork.    Strict return precautions given. I discussed with the patient/family the diagnosis, treatment plan, indications for return to the emergency department, and for expected follow-up.  The patient/family verbalized an understanding. The patient/family is asked if there are any questions or concerns. We discuss the case, until all issues are addressed to the patient/family's satisfaction. Patient/family understands and is agreeable to the plan. Patient is stable and ready for discharge.      Amount and/or Complexity of Data Reviewed  Labs: ordered. Decision-making details documented in ED Course.  Radiology: ordered. Decision-making details documented in ED Course.            Scribe Attestation:   Scribe #1: I performed the above scribed service and the documentation accurately describes the services I performed. I attest to the accuracy of the note.              Scribe attestation: I, Destiney Bowen, personally performed the services described in this documentation. All medical record entries made by the scribe were at my direction and in my presence.  I have reviewed the chart and agree that the record reflects my personal performance and is accurate and complete.                  Clinical Impression:  Final diagnoses:  [M25.439] Wrist swelling  [M79.641] Right hand pain (Primary)  [M25.531] Right wrist pain          ED Disposition Condition    Discharge Stable          ED Prescriptions       Medication Sig Dispense Start Date End Date Auth. Provider    naproxen (NAPROSYN) 500 MG tablet Take 1 tablet (500 mg total) by mouth 2 (two) times daily. 20 tablet 12/18/2023 -- Destiney Bowen PA-C    HYDROcodone-acetaminophen (NORCO) 7.5-325 mg per tablet Take 1 tablet by mouth every 6 (six) hours as needed for Pain. 11 tablet 12/18/2023 -- Destiney Bowen PA-C          Follow-up Information       Follow up With Specialties Details Why Contact Info Additional Information    Orthopedics, [facility] - Orthopedic Surgery, Orthopedic Surgery In 2 days for further evaluation [address] [facility]  [place] [id number] [phone]       [place] - Orthopedics Orthopedics In 2 days for further evaluation  [address]  [address]  [facility] [address] [phone] Please park in [address] or [address] and use [address]to tiwari. Take [address] elevators.    Nima Santiago MD Orthopedic Surgery In 2 days for further evaluation [address] [facility]  [address]  [facility] [id number] [phone]       [place] [facility] ED Emergency Medicine In 2 days for further evaluation [address] [facility] [address] [phone]     [facility] -  Schedule an appointment as soon as possible for a visit in 2 days If symptoms worsen [address] [place] [id number] [phone]                Destiney Bowen PA-C  12/18/23 7957

## 2023-12-19 NOTE — DISCHARGE INSTRUCTIONS
